# Patient Record
Sex: FEMALE | HISPANIC OR LATINO | Employment: OTHER | URBAN - METROPOLITAN AREA
[De-identification: names, ages, dates, MRNs, and addresses within clinical notes are randomized per-mention and may not be internally consistent; named-entity substitution may affect disease eponyms.]

---

## 2021-11-20 ENCOUNTER — HOSPITAL ENCOUNTER (EMERGENCY)
Facility: HOSPITAL | Age: 71
Discharge: HOME | End: 2021-11-20
Attending: EMERGENCY MEDICINE | Admitting: EMERGENCY MEDICINE

## 2021-11-20 ENCOUNTER — APPOINTMENT (OUTPATIENT)
Dept: GENERAL RADIOLOGY | Facility: HOSPITAL | Age: 71
End: 2021-11-20

## 2021-11-20 VITALS
WEIGHT: 200 LBS | BODY MASS INDEX: 36.8 KG/M2 | DIASTOLIC BLOOD PRESSURE: 83 MMHG | HEIGHT: 62 IN | SYSTOLIC BLOOD PRESSURE: 171 MMHG | HEART RATE: 61 BPM | RESPIRATION RATE: 20 BRPM | TEMPERATURE: 97.3 F | OXYGEN SATURATION: 93 %

## 2021-11-20 DIAGNOSIS — J20.6 ACUTE BRONCHITIS DUE TO RHINOVIRUS: Primary | ICD-10-CM

## 2021-11-20 LAB
ALBUMIN SERPL-MCNC: 4.4 G/DL (ref 3.5–5.2)
ALBUMIN/GLOB SERPL: 1.4 G/DL
ALP SERPL-CCNC: 80 U/L (ref 39–117)
ALT SERPL W P-5'-P-CCNC: 18 U/L (ref 1–33)
ANION GAP SERPL CALCULATED.3IONS-SCNC: 7.2 MMOL/L (ref 5–15)
AST SERPL-CCNC: 15 U/L (ref 1–32)
B PARAPERT DNA SPEC QL NAA+PROBE: NOT DETECTED
B PERT DNA SPEC QL NAA+PROBE: NOT DETECTED
BASOPHILS # BLD AUTO: 0.03 10*3/MM3 (ref 0–0.2)
BASOPHILS NFR BLD AUTO: 0.4 % (ref 0–1.5)
BILIRUB SERPL-MCNC: 0.2 MG/DL (ref 0–1.2)
BUN SERPL-MCNC: 13 MG/DL (ref 8–23)
BUN/CREAT SERPL: 13.8 (ref 7–25)
C PNEUM DNA NPH QL NAA+NON-PROBE: NOT DETECTED
CALCIUM SPEC-SCNC: 9.3 MG/DL (ref 8.6–10.5)
CHLORIDE SERPL-SCNC: 99 MMOL/L (ref 98–107)
CO2 SERPL-SCNC: 33.8 MMOL/L (ref 22–29)
CREAT SERPL-MCNC: 0.94 MG/DL (ref 0.57–1)
DEPRECATED RDW RBC AUTO: 43.2 FL (ref 37–54)
EOSINOPHIL # BLD AUTO: 0 10*3/MM3 (ref 0–0.4)
EOSINOPHIL NFR BLD AUTO: 0 % (ref 0.3–6.2)
ERYTHROCYTE [DISTWIDTH] IN BLOOD BY AUTOMATED COUNT: 13.7 % (ref 12.3–15.4)
FLUAV SUBTYP SPEC NAA+PROBE: NOT DETECTED
FLUBV RNA ISLT QL NAA+PROBE: NOT DETECTED
GFR SERPL CREATININE-BSD FRML MDRD: 58 ML/MIN/1.73
GFR SERPL CREATININE-BSD FRML MDRD: 71 ML/MIN/1.73
GLOBULIN UR ELPH-MCNC: 3.2 GM/DL
GLUCOSE SERPL-MCNC: 161 MG/DL (ref 65–99)
HADV DNA SPEC NAA+PROBE: NOT DETECTED
HCOV 229E RNA SPEC QL NAA+PROBE: NOT DETECTED
HCOV HKU1 RNA SPEC QL NAA+PROBE: NOT DETECTED
HCOV NL63 RNA SPEC QL NAA+PROBE: NOT DETECTED
HCOV OC43 RNA SPEC QL NAA+PROBE: NOT DETECTED
HCT VFR BLD AUTO: 41.2 % (ref 34–46.6)
HGB BLD-MCNC: 13.8 G/DL (ref 12–15.9)
HMPV RNA NPH QL NAA+NON-PROBE: NOT DETECTED
HPIV1 RNA ISLT QL NAA+PROBE: NOT DETECTED
HPIV2 RNA SPEC QL NAA+PROBE: NOT DETECTED
HPIV3 RNA NPH QL NAA+PROBE: NOT DETECTED
HPIV4 P GENE NPH QL NAA+PROBE: NOT DETECTED
IMM GRANULOCYTES # BLD AUTO: 0.04 10*3/MM3 (ref 0–0.05)
IMM GRANULOCYTES NFR BLD AUTO: 0.5 % (ref 0–0.5)
LYMPHOCYTES # BLD AUTO: 2.37 10*3/MM3 (ref 0.7–3.1)
LYMPHOCYTES NFR BLD AUTO: 27.8 % (ref 19.6–45.3)
M PNEUMO IGG SER IA-ACNC: NOT DETECTED
MCH RBC QN AUTO: 29.2 PG (ref 26.6–33)
MCHC RBC AUTO-ENTMCNC: 33.5 G/DL (ref 31.5–35.7)
MCV RBC AUTO: 87.3 FL (ref 79–97)
MONOCYTES # BLD AUTO: 0.62 10*3/MM3 (ref 0.1–0.9)
MONOCYTES NFR BLD AUTO: 7.3 % (ref 5–12)
NEUTROPHILS NFR BLD AUTO: 5.47 10*3/MM3 (ref 1.7–7)
NEUTROPHILS NFR BLD AUTO: 64 % (ref 42.7–76)
NRBC BLD AUTO-RTO: 0 /100 WBC (ref 0–0.2)
NT-PROBNP SERPL-MCNC: 151.2 PG/ML (ref 0–900)
PLATELET # BLD AUTO: 188 10*3/MM3 (ref 140–450)
PMV BLD AUTO: 10.4 FL (ref 6–12)
POTASSIUM SERPL-SCNC: 4.7 MMOL/L (ref 3.5–5.2)
PROT SERPL-MCNC: 7.6 G/DL (ref 6–8.5)
QT INTERVAL: 406 MS
RBC # BLD AUTO: 4.72 10*6/MM3 (ref 3.77–5.28)
RHINOVIRUS RNA SPEC NAA+PROBE: DETECTED
RSV RNA NPH QL NAA+NON-PROBE: NOT DETECTED
SARS-COV-2 RNA NPH QL NAA+NON-PROBE: NOT DETECTED
SODIUM SERPL-SCNC: 140 MMOL/L (ref 136–145)
TROPONIN T SERPL-MCNC: <0.01 NG/ML (ref 0–0.03)
WBC NRBC COR # BLD: 8.53 10*3/MM3 (ref 3.4–10.8)

## 2021-11-20 PROCEDURE — 99283 EMERGENCY DEPT VISIT LOW MDM: CPT

## 2021-11-20 PROCEDURE — 71045 X-RAY EXAM CHEST 1 VIEW: CPT

## 2021-11-20 PROCEDURE — 83880 ASSAY OF NATRIURETIC PEPTIDE: CPT | Performed by: EMERGENCY MEDICINE

## 2021-11-20 PROCEDURE — 80053 COMPREHEN METABOLIC PANEL: CPT | Performed by: EMERGENCY MEDICINE

## 2021-11-20 PROCEDURE — 84484 ASSAY OF TROPONIN QUANT: CPT | Performed by: EMERGENCY MEDICINE

## 2021-11-20 PROCEDURE — 93005 ELECTROCARDIOGRAM TRACING: CPT | Performed by: EMERGENCY MEDICINE

## 2021-11-20 PROCEDURE — 93010 ELECTROCARDIOGRAM REPORT: CPT | Performed by: INTERNAL MEDICINE

## 2021-11-20 PROCEDURE — 0202U NFCT DS 22 TRGT SARS-COV-2: CPT | Performed by: EMERGENCY MEDICINE

## 2021-11-20 PROCEDURE — 85025 COMPLETE CBC W/AUTO DIFF WBC: CPT | Performed by: EMERGENCY MEDICINE

## 2021-11-20 RX ORDER — GUAIFENESIN AND DEXTROMETHORPHAN HYDROBROMIDE 600; 30 MG/1; MG/1
1 TABLET, EXTENDED RELEASE ORAL 2 TIMES DAILY PRN
Qty: 20 TABLET | Refills: 0 | Status: SHIPPED | OUTPATIENT
Start: 2021-11-20

## 2021-11-20 RX ORDER — SODIUM CHLORIDE 0.9 % (FLUSH) 0.9 %
10 SYRINGE (ML) INJECTION AS NEEDED
Status: DISCONTINUED | OUTPATIENT
Start: 2021-11-20 | End: 2021-11-20 | Stop reason: HOSPADM

## 2021-11-20 RX ORDER — ALBUTEROL SULFATE 90 UG/1
2 AEROSOL, METERED RESPIRATORY (INHALATION) EVERY 4 HOURS PRN
Qty: 18 G | Refills: 0 | Status: SHIPPED | OUTPATIENT
Start: 2021-11-20

## 2021-11-20 NOTE — ED NOTES
C/o of cough for 2 weeks, generalized weakness. Reports dry heaving with cough, loss of appetite. Pt reports having both covid vaccines.   Primarily speaks Turkmen   This rn wearing mask and goggles. Pt wearing mask during encounter.        Maeve High, RN  11/20/21 1816       Maeve High, ANASTASIIA  11/20/21 1827

## 2021-11-21 NOTE — ED PROVIDER NOTES
EMERGENCY DEPARTMENT ENCOUNTER    Room Number:  09/09  Date seen:  11/21/2021  PCP: Provider, No Known  Historian: Patient      HPI:  Chief Complaint: Cough  A complete HPI/ROS/PMH/PSH/SH/FH are unobtainable due to: Nothing  Context: Camryn Dorsey is a 71 y.o. female who presents to the ED c/o cough.  Patient reports that her symptoms have been ongoing for about 2 weeks now.  She reports that she thought they were getting better but then she began coughing again.  She denies fever or chills.  She denies chest pain or shortness of breath.  She reports that she can hear rattling and wheezing in her chest.  She denies history of asthma or COPD.  She has had 2 Covid vaccines and she denies known exposure to COVID-19.  She denies history of congestive heart failure.  She denies leg swelling.            PAST MEDICAL HISTORY  Active Ambulatory Problems     Diagnosis Date Noted   • No Active Ambulatory Problems     Resolved Ambulatory Problems     Diagnosis Date Noted   • No Resolved Ambulatory Problems     No Additional Past Medical History         PAST SURGICAL HISTORY  No past surgical history on file.      FAMILY HISTORY  No family history on file.      SOCIAL HISTORY  Social History     Socioeconomic History   • Marital status:          ALLERGIES  Patient has no known allergies.        REVIEW OF SYSTEMS  Review of Systems   Review of all 14 systems is negative other than stated in the HPI above.      PHYSICAL EXAM  ED Triage Vitals   Temp Heart Rate Resp BP SpO2   11/20/21 1817 11/20/21 1817 11/20/21 1817 11/20/21 1910 11/20/21 1817   97.3 °F (36.3 °C) 61 20 151/82 97 %      Temp src Heart Rate Source Patient Position BP Location FiO2 (%)   -- -- -- -- --                GENERAL: Awake and alert, no acute distress  HENT: nares patent  EYES: no scleral icterus  CV: regular rhythm, normal rate  RESPIRATORY: normal effort, mild expiratory wheezing throughout all lung fields  ABDOMEN: soft, nondistended,  nontender throughout  MUSCULOSKELETAL: no deformity, no peripheral edema  NEURO: alert, moves all extremities, follows commands  PSYCH:  calm, cooperative  SKIN: warm, dry    Vital signs and nursing notes reviewed.          LAB RESULTS  Recent Results (from the past 24 hour(s))   ECG 12 Lead    Collection Time: 11/20/21  6:52 PM   Result Value Ref Range    QT Interval 406 ms   Comprehensive Metabolic Panel    Collection Time: 11/20/21  7:19 PM    Specimen: Blood   Result Value Ref Range    Glucose 161 (H) 65 - 99 mg/dL    BUN 13 8 - 23 mg/dL    Creatinine 0.94 0.57 - 1.00 mg/dL    Sodium 140 136 - 145 mmol/L    Potassium 4.7 3.5 - 5.2 mmol/L    Chloride 99 98 - 107 mmol/L    CO2 33.8 (H) 22.0 - 29.0 mmol/L    Calcium 9.3 8.6 - 10.5 mg/dL    Total Protein 7.6 6.0 - 8.5 g/dL    Albumin 4.40 3.50 - 5.20 g/dL    ALT (SGPT) 18 1 - 33 U/L    AST (SGOT) 15 1 - 32 U/L    Alkaline Phosphatase 80 39 - 117 U/L    Total Bilirubin 0.2 0.0 - 1.2 mg/dL    eGFR Non African Amer 58 (L) >60 mL/min/1.73    eGFR  African Amer 71 >60 mL/min/1.73    Globulin 3.2 gm/dL    A/G Ratio 1.4 g/dL    BUN/Creatinine Ratio 13.8 7.0 - 25.0    Anion Gap 7.2 5.0 - 15.0 mmol/L   BNP    Collection Time: 11/20/21  7:19 PM    Specimen: Blood   Result Value Ref Range    proBNP 151.2 0.0 - 900.0 pg/mL   Troponin    Collection Time: 11/20/21  7:19 PM    Specimen: Blood   Result Value Ref Range    Troponin T <0.010 0.000 - 0.030 ng/mL   CBC Auto Differential    Collection Time: 11/20/21  7:19 PM    Specimen: Blood   Result Value Ref Range    WBC 8.53 3.40 - 10.80 10*3/mm3    RBC 4.72 3.77 - 5.28 10*6/mm3    Hemoglobin 13.8 12.0 - 15.9 g/dL    Hematocrit 41.2 34.0 - 46.6 %    MCV 87.3 79.0 - 97.0 fL    MCH 29.2 26.6 - 33.0 pg    MCHC 33.5 31.5 - 35.7 g/dL    RDW 13.7 12.3 - 15.4 %    RDW-SD 43.2 37.0 - 54.0 fl    MPV 10.4 6.0 - 12.0 fL    Platelets 188 140 - 450 10*3/mm3    Neutrophil % 64.0 42.7 - 76.0 %    Lymphocyte % 27.8 19.6 - 45.3 %    Monocyte % 7.3  5.0 - 12.0 %    Eosinophil % 0.0 (L) 0.3 - 6.2 %    Basophil % 0.4 0.0 - 1.5 %    Immature Grans % 0.5 0.0 - 0.5 %    Neutrophils, Absolute 5.47 1.70 - 7.00 10*3/mm3    Lymphocytes, Absolute 2.37 0.70 - 3.10 10*3/mm3    Monocytes, Absolute 0.62 0.10 - 0.90 10*3/mm3    Eosinophils, Absolute 0.00 0.00 - 0.40 10*3/mm3    Basophils, Absolute 0.03 0.00 - 0.20 10*3/mm3    Immature Grans, Absolute 0.04 0.00 - 0.05 10*3/mm3    nRBC 0.0 0.0 - 0.2 /100 WBC   Respiratory Panel PCR w/COVID-19(SARS-CoV-2) NARGIS/FLAKO/JEAN/PAD/[facility]/MAD/YOU In-House, NP Swab in UTM/VTM, 3-4 HR TAT - Swab, Nasopharynx    Collection Time: 11/20/21  7:20 PM    Specimen: Nasopharynx; Swab   Result Value Ref Range    ADENOVIRUS, PCR Not Detected Not Detected    Coronavirus 229E Not Detected Not Detected    Coronavirus HKU1 Not Detected Not Detected    Coronavirus NL63 Not Detected Not Detected    Coronavirus OC43 Not Detected Not Detected    COVID19 Not Detected Not Detected - Ref. Range    Human Metapneumovirus Not Detected Not Detected    Human Rhinovirus/Enterovirus Detected (A) Not Detected    Influenza A PCR Not Detected Not Detected    Influenza B PCR Not Detected Not Detected    Parainfluenza Virus 1 Not Detected Not Detected    Parainfluenza Virus 2 Not Detected Not Detected    Parainfluenza Virus 3 Not Detected Not Detected    Parainfluenza Virus 4 Not Detected Not Detected    RSV, PCR Not Detected Not Detected    Bordetella pertussis pcr Not Detected Not Detected    Bordetella parapertussis PCR Not Detected Not Detected    Chlamydophila pneumoniae PCR Not Detected Not Detected    Mycoplasma pneumo by PCR Not Detected Not Detected       Ordered the above labs and reviewed the results.        RADIOLOGY  XR Chest 1 View    Result Date: 11/20/2021  Portable chest radiograph  HISTORY:Cough  TECHNIQUE: Single AP portable radiograph of the chest  COMPARISON:None      FINDINGS AND IMPRESSION: The lungs are hypoinflated. Evaluation is significantly  suboptimal due to patient body habitus. Mild asymmetric pulmonary opacification is present within the right base suggestive of atelectasis versus early pneumonia in the appropriate clinical context and correlation with patient history is recommended. No pneumothorax or pleural effusion is seen. Cardiac silhouette is accentuated by low lung volumes.  This report was finalized on 11/20/2021 7:34 PM by Dr. Chucky Tan M.D.        Ordered the above noted radiological studies. Reviewed by me in PACS.            PROCEDURES  Procedures            MEDICATIONS GIVEN IN ER  Medications - No data to display                MEDICAL DECISION MAKING, PROGRESS, and CONSULTS    All labs have been independently reviewed by me.  All radiology studies have been reviewed by me and discussed with radiologist dictating the report.   EKG's independently viewed and interpreted by me.  Discussion below represents my analysis of pertinent findings related to patient's condition, differential diagnosis, treatment plan and final disposition.      Differential diagnosis includes but is not limited to:  Pneumonia  Congestive heart failure  COVID-19 infection  Bronchitis  COPD      ED Course as of 11/21/21 0018   Sat Nov 20, 2021   1917 EKG          EKG time: 1852  Rhythm/Rate: Sinus rhythm, 61  P waves and [place]: First-degree AV block  QRS, axis: Low voltage, normal axis  ST and T waves: No acute ischemic changes    Interpreted Contemporaneously by me, independently viewed         [JR]   2055 Human Rhinovirus/Enterovirus(!): Detected [JR]   2055 COVID19: Not Detected [JR]   2111 Troponin T: <0.010 [JR]   2111 Glucose(!): 161 [JR]      ED Course User Index  [JR] Gunner Bailon MD     Presentation consistent with acute bronchitis secondary to rhinovirus infection.  She is not hypoxic and is in no respiratory distress.  She will be provided with Mucinex DM as needed as well as albuterol HFA inhaler as needed for wheezing.  COVID-19 testing  negative today.  Chest x-ray is not convincing for pneumonia and she has no leukocytosis, no hypoxia.  Antibiotics not indicated at this time.  Follow-up PCP.  Return precautions were discussed.         I wore an N95 mask, face shield, and gloves during this patient encounter.  Patient also wearing a surgical mask.  Hand hygeine performed before and after seeing the patient.    DIAGNOSIS  Final diagnoses:   Acute bronchitis due to Rhinovirus         DISPOSITION  DISCHARGE    Patient discharged in stable condition.    Reviewed implications of results, diagnosis, meds, responsibility to follow up, warning signs and symptoms of possible worsening, potential complications and reasons to return to ER.    Patient/Family voiced understanding of above instructions.    Discussed plan for discharge, as there is no emergent indication for admission. Patient referred to primary care provider for BP management due to today's BP. Pt/family is agreeable and understands need for follow up and repeat testing.  Pt is aware that discharge does not mean that nothing is wrong but it indicates no emergency is present that requires admission and they must continue care with follow-up as given below or physician of their choice.     FOLLOW-UP  PATIENT CONNECTION - [place] [id number] [phone]  Call in 1 day           Medication List      New Prescriptions    albuterol sulfate  (90 Base) MCG/ACT inhaler  Commonly known as: PROVENTIL HFA;VENTOLIN HFA;PROAIR HFA  Inhale 2 puffs Every 4 (Four) Hours As Needed for Wheezing.     guaifenesin-dextromethorphan  MG tablet sustained-release 12 hour tablet  Take 1 tablet by mouth 2 (Two) Times a Day As Needed (cough).           Where to Get Your Medications      You can get these medications from any pharmacy    Bring a paper prescription for each of these medications  · albuterol sulfate  (90 Base) MCG/ACT inhaler  · guaifenesin-dextromethorphan  MG  tablet sustained-release 12 hour tablet                   Latest Documented Vital Signs:  As of 00:18 EST  BP- 171/83 HR- 61 Temp- 97.3 °F (36.3 °C) O2 sat- 93%        --    Please note that portions of this were completed with a voice recognition program.            Gunner Bailon MD  11/21/21 0020

## 2021-11-21 NOTE — ED NOTES
This RN wearing appropriate PPE for all patient interactions including n95, gown, gloves, and eye protection. Patient wearing mask during patient interaction. Hand hygine preformed.      Kai Jo, RN  11/20/21 1929

## 2023-09-07 ENCOUNTER — APPOINTMENT (OUTPATIENT)
Dept: GENERAL RADIOLOGY | Facility: HOSPITAL | Age: 73
DRG: 243 | End: 2023-09-07
Payer: MEDICARE

## 2023-09-07 ENCOUNTER — HOSPITAL ENCOUNTER (INPATIENT)
Facility: HOSPITAL | Age: 73
LOS: 2 days | Discharge: HOME | DRG: 243 | End: 2023-09-09
Attending: EMERGENCY MEDICINE | Admitting: INTERNAL MEDICINE
Payer: MEDICARE

## 2023-09-07 DIAGNOSIS — R42 LIGHTHEADEDNESS: Primary | ICD-10-CM

## 2023-09-07 DIAGNOSIS — I44.1 SECOND DEGREE HEART BLOCK: ICD-10-CM

## 2023-09-07 DIAGNOSIS — I10 ELEVATED BLOOD PRESSURE READING IN OFFICE WITH DIAGNOSIS OF HYPERTENSION: ICD-10-CM

## 2023-09-07 PROBLEM — E11.9 DM (DIABETES MELLITUS): Status: ACTIVE | Noted: 2023-09-07

## 2023-09-07 PROBLEM — R73.9 HYPERGLYCEMIA: Status: ACTIVE | Noted: 2023-09-07

## 2023-09-07 LAB
ALBUMIN SERPL-MCNC: 4.1 G/DL (ref 3.5–5.2)
ALBUMIN/GLOB SERPL: 1.5 G/DL
ALP SERPL-CCNC: 82 U/L (ref 39–117)
ALT SERPL W P-5'-P-CCNC: 28 U/L (ref 1–33)
ANION GAP SERPL CALCULATED.3IONS-SCNC: 11 MMOL/L (ref 5–15)
AST SERPL-CCNC: 20 U/L (ref 1–32)
BASOPHILS # BLD AUTO: 0.03 10*3/MM3 (ref 0–0.2)
BASOPHILS NFR BLD AUTO: 0.4 % (ref 0–1.5)
BILIRUB SERPL-MCNC: 0.4 MG/DL (ref 0–1.2)
BUN SERPL-MCNC: 19 MG/DL (ref 8–23)
BUN/CREAT SERPL: 22.1 (ref 7–25)
CALCIUM SPEC-SCNC: 9.5 MG/DL (ref 8.6–10.5)
CHLORIDE SERPL-SCNC: 103 MMOL/L (ref 98–107)
CO2 SERPL-SCNC: 26 MMOL/L (ref 22–29)
CREAT SERPL-MCNC: 0.86 MG/DL (ref 0.57–1)
DEPRECATED RDW RBC AUTO: 43.7 FL (ref 37–54)
EGFRCR SERPLBLD CKD-EPI 2021: 70.6 ML/MIN/1.73
EOSINOPHIL # BLD AUTO: 0 10*3/MM3 (ref 0–0.4)
EOSINOPHIL NFR BLD AUTO: 0 % (ref 0.3–6.2)
ERYTHROCYTE [DISTWIDTH] IN BLOOD BY AUTOMATED COUNT: 13.7 % (ref 12.3–15.4)
GEN 5 2HR TROPONIN T REFLEX: 20 NG/L
GLOBULIN UR ELPH-MCNC: 2.8 GM/DL
GLUCOSE BLDC GLUCOMTR-MCNC: 121 MG/DL (ref 70–130)
GLUCOSE SERPL-MCNC: 155 MG/DL (ref 65–99)
HCT VFR BLD AUTO: 43.7 % (ref 34–46.6)
HGB BLD-MCNC: 14.5 G/DL (ref 12–15.9)
IMM GRANULOCYTES # BLD AUTO: 0.02 10*3/MM3 (ref 0–0.05)
IMM GRANULOCYTES NFR BLD AUTO: 0.2 % (ref 0–0.5)
LYMPHOCYTES # BLD AUTO: 2.29 10*3/MM3 (ref 0.7–3.1)
LYMPHOCYTES NFR BLD AUTO: 28.2 % (ref 19.6–45.3)
MAGNESIUM SERPL-MCNC: 2.9 MG/DL (ref 1.6–2.4)
MCH RBC QN AUTO: 29.2 PG (ref 26.6–33)
MCHC RBC AUTO-ENTMCNC: 33.2 G/DL (ref 31.5–35.7)
MCV RBC AUTO: 88.1 FL (ref 79–97)
MONOCYTES # BLD AUTO: 0.56 10*3/MM3 (ref 0.1–0.9)
MONOCYTES NFR BLD AUTO: 6.9 % (ref 5–12)
NEUTROPHILS NFR BLD AUTO: 5.21 10*3/MM3 (ref 1.7–7)
NEUTROPHILS NFR BLD AUTO: 64.3 % (ref 42.7–76)
NRBC BLD AUTO-RTO: 0 /100 WBC (ref 0–0.2)
PLATELET # BLD AUTO: 167 10*3/MM3 (ref 140–450)
PMV BLD AUTO: 12 FL (ref 6–12)
POTASSIUM SERPL-SCNC: 4.4 MMOL/L (ref 3.5–5.2)
PROT SERPL-MCNC: 6.9 G/DL (ref 6–8.5)
RBC # BLD AUTO: 4.96 10*6/MM3 (ref 3.77–5.28)
SODIUM SERPL-SCNC: 140 MMOL/L (ref 136–145)
T4 FREE SERPL-MCNC: 1.32 NG/DL (ref 0.93–1.7)
TROPONIN T DELTA: 2 NG/L
TROPONIN T SERPL HS-MCNC: 18 NG/L
TSH SERPL DL<=0.05 MIU/L-ACNC: 1.85 UIU/ML (ref 0.27–4.2)
WBC NRBC COR # BLD: 8.11 10*3/MM3 (ref 3.4–10.8)

## 2023-09-07 PROCEDURE — 93005 ELECTROCARDIOGRAM TRACING: CPT | Performed by: EMERGENCY MEDICINE

## 2023-09-07 PROCEDURE — 83735 ASSAY OF MAGNESIUM: CPT | Performed by: EMERGENCY MEDICINE

## 2023-09-07 PROCEDURE — 36415 COLL VENOUS BLD VENIPUNCTURE: CPT | Performed by: EMERGENCY MEDICINE

## 2023-09-07 PROCEDURE — 99285 EMERGENCY DEPT VISIT HI MDM: CPT

## 2023-09-07 PROCEDURE — 84443 ASSAY THYROID STIM HORMONE: CPT | Performed by: EMERGENCY MEDICINE

## 2023-09-07 PROCEDURE — 85025 COMPLETE CBC W/AUTO DIFF WBC: CPT | Performed by: EMERGENCY MEDICINE

## 2023-09-07 PROCEDURE — 80053 COMPREHEN METABOLIC PANEL: CPT | Performed by: EMERGENCY MEDICINE

## 2023-09-07 PROCEDURE — 94640 AIRWAY INHALATION TREATMENT: CPT

## 2023-09-07 PROCEDURE — 93010 ELECTROCARDIOGRAM REPORT: CPT | Performed by: STUDENT IN AN ORGANIZED HEALTH CARE EDUCATION/TRAINING PROGRAM

## 2023-09-07 PROCEDURE — 71045 X-RAY EXAM CHEST 1 VIEW: CPT

## 2023-09-07 PROCEDURE — 84439 ASSAY OF FREE THYROXINE: CPT | Performed by: EMERGENCY MEDICINE

## 2023-09-07 PROCEDURE — 82948 REAGENT STRIP/BLOOD GLUCOSE: CPT

## 2023-09-07 PROCEDURE — 84484 ASSAY OF TROPONIN QUANT: CPT | Performed by: EMERGENCY MEDICINE

## 2023-09-07 RX ORDER — SODIUM CHLORIDE 9 MG/ML
40 INJECTION, SOLUTION INTRAVENOUS AS NEEDED
Status: DISCONTINUED | OUTPATIENT
Start: 2023-09-07 | End: 2023-09-09 | Stop reason: HOSPADM

## 2023-09-07 RX ORDER — GABAPENTIN 300 MG/1
600 CAPSULE ORAL NIGHTLY
COMMUNITY

## 2023-09-07 RX ORDER — TOPIRAMATE 50 MG/1
50 TABLET, FILM COATED ORAL 2 TIMES DAILY
COMMUNITY

## 2023-09-07 RX ORDER — DICLOFENAC SODIUM AND MISOPROSTOL 50; 200 MG/1; UG/1
1 TABLET, DELAYED RELEASE ORAL 2 TIMES DAILY
COMMUNITY

## 2023-09-07 RX ORDER — FLUTICASONE PROPIONATE 50 MCG
2 SPRAY, SUSPENSION (ML) NASAL DAILY
COMMUNITY

## 2023-09-07 RX ORDER — BISACODYL 10 MG
10 SUPPOSITORY, RECTAL RECTAL DAILY PRN
Status: DISCONTINUED | OUTPATIENT
Start: 2023-09-07 | End: 2023-09-09 | Stop reason: HOSPADM

## 2023-09-07 RX ORDER — METOPROLOL TARTRATE 100 MG/1
100 TABLET ORAL 2 TIMES DAILY
COMMUNITY
End: 2023-09-09 | Stop reason: HOSPADM

## 2023-09-07 RX ORDER — OMEPRAZOLE 20 MG/1
20 CAPSULE, DELAYED RELEASE ORAL DAILY PRN
COMMUNITY

## 2023-09-07 RX ORDER — BISACODYL 5 MG/1
5 TABLET, DELAYED RELEASE ORAL DAILY PRN
Status: DISCONTINUED | OUTPATIENT
Start: 2023-09-07 | End: 2023-09-09 | Stop reason: HOSPADM

## 2023-09-07 RX ORDER — PANTOPRAZOLE SODIUM 40 MG/1
40 TABLET, DELAYED RELEASE ORAL
Status: DISCONTINUED | OUTPATIENT
Start: 2023-09-08 | End: 2023-09-09 | Stop reason: HOSPADM

## 2023-09-07 RX ORDER — ONDANSETRON 2 MG/ML
4 INJECTION INTRAMUSCULAR; INTRAVENOUS EVERY 6 HOURS PRN
Status: DISCONTINUED | OUTPATIENT
Start: 2023-09-07 | End: 2023-09-09 | Stop reason: HOSPADM

## 2023-09-07 RX ORDER — INSULIN LISPRO 100 [IU]/ML
2-9 INJECTION, SOLUTION INTRAVENOUS; SUBCUTANEOUS
Status: DISCONTINUED | OUTPATIENT
Start: 2023-09-07 | End: 2023-09-09 | Stop reason: HOSPADM

## 2023-09-07 RX ORDER — BACLOFEN 10 MG/1
10 TABLET ORAL 3 TIMES DAILY PRN
COMMUNITY

## 2023-09-07 RX ORDER — ROSUVASTATIN CALCIUM 40 MG/1
40 TABLET, COATED ORAL NIGHTLY
COMMUNITY

## 2023-09-07 RX ORDER — GABAPENTIN 300 MG/1
300 CAPSULE ORAL EVERY MORNING
Status: DISCONTINUED | OUTPATIENT
Start: 2023-09-08 | End: 2023-09-08

## 2023-09-07 RX ORDER — ALBUTEROL SULFATE 2.5 MG/3ML
2.5 SOLUTION RESPIRATORY (INHALATION) EVERY 6 HOURS PRN
Status: DISCONTINUED | OUTPATIENT
Start: 2023-09-07 | End: 2023-09-09 | Stop reason: HOSPADM

## 2023-09-07 RX ORDER — ACETAMINOPHEN 650 MG/1
650 SUPPOSITORY RECTAL EVERY 4 HOURS PRN
Status: DISCONTINUED | OUTPATIENT
Start: 2023-09-07 | End: 2023-09-08 | Stop reason: SDUPTHER

## 2023-09-07 RX ORDER — FLUTICASONE PROPIONATE 50 MCG
2 SPRAY, SUSPENSION (ML) NASAL DAILY
Status: DISCONTINUED | OUTPATIENT
Start: 2023-09-07 | End: 2023-09-09 | Stop reason: HOSPADM

## 2023-09-07 RX ORDER — ACETAMINOPHEN 160 MG/5ML
650 SOLUTION ORAL EVERY 4 HOURS PRN
Status: DISCONTINUED | OUTPATIENT
Start: 2023-09-07 | End: 2023-09-08 | Stop reason: SDUPTHER

## 2023-09-07 RX ORDER — FUROSEMIDE 40 MG/1
40 TABLET ORAL DAILY
Status: DISCONTINUED | OUTPATIENT
Start: 2023-09-07 | End: 2023-09-09 | Stop reason: HOSPADM

## 2023-09-07 RX ORDER — FLUTICASONE FUROATE, UMECLIDINIUM BROMIDE AND VILANTEROL TRIFENATATE 200; 62.5; 25 UG/1; UG/1; UG/1
1 POWDER RESPIRATORY (INHALATION) DAILY
COMMUNITY

## 2023-09-07 RX ORDER — NITROGLYCERIN 0.4 MG/1
0.4 TABLET SUBLINGUAL
Status: DISCONTINUED | OUTPATIENT
Start: 2023-09-07 | End: 2023-09-09 | Stop reason: HOSPADM

## 2023-09-07 RX ORDER — BUDESONIDE AND FORMOTEROL FUMARATE DIHYDRATE 160; 4.5 UG/1; UG/1
2 AEROSOL RESPIRATORY (INHALATION)
Status: DISCONTINUED | OUTPATIENT
Start: 2023-09-07 | End: 2023-09-09 | Stop reason: HOSPADM

## 2023-09-07 RX ORDER — FUROSEMIDE 40 MG/1
40 TABLET ORAL DAILY
COMMUNITY

## 2023-09-07 RX ORDER — POLYETHYLENE GLYCOL 3350 17 G/17G
17 POWDER, FOR SOLUTION ORAL DAILY PRN
Status: DISCONTINUED | OUTPATIENT
Start: 2023-09-07 | End: 2023-09-09 | Stop reason: HOSPADM

## 2023-09-07 RX ORDER — CETIRIZINE HYDROCHLORIDE 10 MG/1
10 TABLET ORAL DAILY
Status: DISCONTINUED | OUTPATIENT
Start: 2023-09-07 | End: 2023-09-09 | Stop reason: HOSPADM

## 2023-09-07 RX ORDER — ROSUVASTATIN CALCIUM 40 MG/1
40 TABLET, COATED ORAL NIGHTLY
Status: DISCONTINUED | OUTPATIENT
Start: 2023-09-07 | End: 2023-09-09 | Stop reason: HOSPADM

## 2023-09-07 RX ORDER — NICOTINE POLACRILEX 4 MG
15 LOZENGE BUCCAL
Status: DISCONTINUED | OUTPATIENT
Start: 2023-09-07 | End: 2023-09-09 | Stop reason: HOSPADM

## 2023-09-07 RX ORDER — HYDRALAZINE HYDROCHLORIDE 50 MG/1
50 TABLET, FILM COATED ORAL EVERY 8 HOURS SCHEDULED
Status: DISCONTINUED | OUTPATIENT
Start: 2023-09-07 | End: 2023-09-09 | Stop reason: HOSPADM

## 2023-09-07 RX ORDER — IBUPROFEN 600 MG/1
1 TABLET ORAL
Status: DISCONTINUED | OUTPATIENT
Start: 2023-09-07 | End: 2023-09-09 | Stop reason: HOSPADM

## 2023-09-07 RX ORDER — DAPAGLIFLOZIN 10 MG/1
10 TABLET, FILM COATED ORAL DAILY
COMMUNITY

## 2023-09-07 RX ORDER — TOPIRAMATE 50 MG/1
50 TABLET, FILM COATED ORAL 2 TIMES DAILY
Status: DISCONTINUED | OUTPATIENT
Start: 2023-09-07 | End: 2023-09-09 | Stop reason: HOSPADM

## 2023-09-07 RX ORDER — GABAPENTIN 300 MG/1
300 CAPSULE ORAL EVERY MORNING
COMMUNITY

## 2023-09-07 RX ORDER — ACETAMINOPHEN 325 MG/1
650 TABLET ORAL EVERY 4 HOURS PRN
Status: DISCONTINUED | OUTPATIENT
Start: 2023-09-07 | End: 2023-09-08 | Stop reason: SDUPTHER

## 2023-09-07 RX ORDER — GABAPENTIN 300 MG/1
600 CAPSULE ORAL NIGHTLY
Status: DISCONTINUED | OUTPATIENT
Start: 2023-09-07 | End: 2023-09-08

## 2023-09-07 RX ORDER — AMOXICILLIN 250 MG
2 CAPSULE ORAL 2 TIMES DAILY
Status: DISCONTINUED | OUTPATIENT
Start: 2023-09-07 | End: 2023-09-09 | Stop reason: HOSPADM

## 2023-09-07 RX ORDER — LORATADINE 10 MG/1
10 TABLET ORAL DAILY
COMMUNITY

## 2023-09-07 RX ORDER — SODIUM CHLORIDE 0.9 % (FLUSH) 0.9 %
10 SYRINGE (ML) INJECTION EVERY 12 HOURS SCHEDULED
Status: DISCONTINUED | OUTPATIENT
Start: 2023-09-07 | End: 2023-09-09 | Stop reason: HOSPADM

## 2023-09-07 RX ORDER — DULAGLUTIDE 1.5 MG/.5ML
1.5 INJECTION, SOLUTION SUBCUTANEOUS WEEKLY
COMMUNITY

## 2023-09-07 RX ORDER — DEXTROSE MONOHYDRATE 25 G/50ML
25 INJECTION, SOLUTION INTRAVENOUS
Status: DISCONTINUED | OUTPATIENT
Start: 2023-09-07 | End: 2023-09-09 | Stop reason: HOSPADM

## 2023-09-07 RX ORDER — BACLOFEN 10 MG/1
10 TABLET ORAL 3 TIMES DAILY PRN
Status: DISCONTINUED | OUTPATIENT
Start: 2023-09-07 | End: 2023-09-08

## 2023-09-07 RX ORDER — SODIUM CHLORIDE 0.9 % (FLUSH) 0.9 %
10 SYRINGE (ML) INJECTION AS NEEDED
Status: DISCONTINUED | OUTPATIENT
Start: 2023-09-07 | End: 2023-09-09 | Stop reason: HOSPADM

## 2023-09-07 RX ADMIN — ROSUVASTATIN CALCIUM 40 MG: 40 TABLET, FILM COATED ORAL at 22:21

## 2023-09-07 RX ADMIN — FUROSEMIDE 40 MG: 40 TABLET ORAL at 22:16

## 2023-09-07 RX ADMIN — Medication 10 ML: at 22:22

## 2023-09-07 RX ADMIN — TOPIRAMATE 50 MG: 50 TABLET, FILM COATED ORAL at 22:21

## 2023-09-07 RX ADMIN — FLUTICASONE PROPIONATE 2 SPRAY: 50 SPRAY, METERED NASAL at 22:21

## 2023-09-07 RX ADMIN — HYDRALAZINE HYDROCHLORIDE 50 MG: 50 TABLET, FILM COATED ORAL at 22:21

## 2023-09-07 RX ADMIN — GABAPENTIN 600 MG: 300 CAPSULE ORAL at 22:16

## 2023-09-07 RX ADMIN — BUDESONIDE AND FORMOTEROL FUMARATE DIHYDRATE 2 PUFF: 160; 4.5 AEROSOL RESPIRATORY (INHALATION) at 23:58

## 2023-09-07 RX ADMIN — CETIRIZINE HYDROCHLORIDE 10 MG: 10 TABLET ORAL at 22:20

## 2023-09-07 NOTE — PROGRESS NOTES
Clinical Pharmacy Services: Medication History    Camryn Dorsey is a 73 y.o. female presenting to [facility] for   Chief Complaint   Patient presents with    Dizziness    Fatigue       She  has no past medical history on file.    Allergies as of 09/07/2023    (No Known Allergies)       Medication information was obtained from: Patient Supplied Med List   Pharmacy and Phone Number:     Prior to Admission Medications       Prescriptions Last Dose Informant Patient Reported? Taking?    albuterol sulfate  (90 Base) MCG/ACT inhaler  Other No Yes    Inhale 2 puffs Every 4 (Four) Hours As Needed for Wheezing.    baclofen (LIORESAL) 10 MG tablet  Other Yes Yes    Take 1 tablet by mouth 3 (Three) Times a Day As Needed.    dapagliflozin Propanediol (Farxiga) 10 MG tablet  Other Yes Yes    Take 10 mg by mouth Daily.    diclofenac-miSOPROStol (ARTHROTEC 50) 50-0.2 MG EC tablet  Other Yes Yes    Take 1 tablet by mouth 2 (Two) Times a Day.    Dulaglutide (Trulicity) 1.5 MG/0.5ML solution pen-injector  Other Yes Yes    Inject 1.5 mg under the skin into the appropriate area as directed 1 (One) Time Per Week.    fluticasone (FLONASE) 50 MCG/ACT nasal spray  Other Yes Yes    2 sprays into the nostril(s) as directed by provider Daily.    Fluticasone-Umeclidin-Vilant (Trelegy Ellipta) 200-62.5-25 MCG/ACT aerosol powder   Other Yes Yes    Inhale 1 puff Daily.    furosemide (LASIX) 40 MG tablet  Other Yes Yes    Take 1 tablet by mouth Daily.    gabapentin (NEURONTIN) 300 MG capsule  Other Yes Yes    Take 1 capsule by mouth Every Morning.    gabapentin (NEURONTIN) 300 MG capsule  Other Yes Yes    Take 2 capsules by mouth Every Night.    loratadine (CLARITIN) 10 MG tablet  Other Yes Yes    Take 1 tablet by mouth Daily.    metFORMIN (GLUCOPHAGE) 1000 MG tablet  Other Yes Yes    Take 1 tablet by mouth 2 (Two) Times a Day With Meals.    metoprolol tartrate (LOPRESSOR) 100 MG tablet  Other Yes Yes    Take 1  tablet by mouth 2 (Two) Times a Day.    omeprazole (priLOSEC) 20 MG capsule  Other Yes Yes    Take 1 capsule by mouth Daily As Needed.    rosuvastatin (Crestor) 40 MG tablet  Other Yes Yes    Take 1 tablet by mouth Every Night.    topiramate (TOPAMAX) 50 MG tablet  Other Yes Yes    Take 1 tablet by mouth 2 (Two) Times a Day.    guaifenesin-dextromethorphan (MUCINEX DM)  MG tablet sustained-release 12 hour tablet   No No    Take 1 tablet by mouth 2 (Two) Times a Day As Needed (cough).              Medication notes:     This medication list is complete to the best of my knowledge as of 9/7/2023    Please call if questions.    James Mims  Medication History Technician   [phone]    9/7/2023 17:51 EDT

## 2023-09-07 NOTE — ED PROVIDER NOTES
EMERGENCY DEPARTMENT ENCOUNTER    Room Number:  38/38  Date of encounter:  9/7/2023  PCP: Provider, No Known  Historian: Patient    Patient was placed in face mask during triage process. Patient was wearing facemask when I entered the room and throughout our encounter. I wore full protective equipment throughout this patient encounter including a face mask, eye protection, and gloves. Hand hygiene was performed before donning protective equipment and again following doffing of PPE after leaving the room.    HPI:  Chief Complaint: Cardiac  A complete HPI/ROS/PMH/PSH/SH/FH are unobtainable due to: Use of   Context: Camryn Dorsey is a 73 y.o. female who presents to the ED c/o movement of dizziness and just generally not feeling well.  Patient was at adult  and so she had the nurse check her blood pressure and heart rate and found that she was bradycardic.  The patient has taken 2 doses of her metoprolol 100 mg today to help with her blood pressure.  No syncope.  The patient is recovering from recent cold-like symptoms but has no ongoing fevers.  She has a bit of a cough and mild dyspnea remaining.  No nausea vomiting, dysuria hematuria, syncope or near syncope reported.  Patient does note waxing and waning lower extremity edema that is not unusual for her and has been occurring for the last year.  No calf tenderness reported.    MEDICAL HISTORY REVIEW  EMR reviewed:    PCP office note from 7/26/23 reviewed:  Pt follow for chronic leg swelling, varicose veins, Type 2 diabetes and primary hypertension    PAST MEDICAL HISTORY  Active Ambulatory Problems     Diagnosis Date Noted    No Active Ambulatory Problems     Resolved Ambulatory Problems     Diagnosis Date Noted    No Resolved Ambulatory Problems     No Additional Past Medical History         PAST SURGICAL HISTORY  No past surgical history on file.      FAMILY HISTORY  No family history on file.      SOCIAL HISTORY  Social  History     Socioeconomic History    Marital status:          ALLERGIES  Patient has no known allergies.        REVIEW OF SYSTEMS  Review of Systems     All systems reviewed and negative except for those discussed in HPI.       PHYSICAL EXAM    I have reviewed the triage vital signs and nursing notes.    ED Triage Vitals   Temp Heart Rate Resp BP SpO2   09/07/23 1321 09/07/23 1319 09/07/23 1319 09/07/23 1319 09/07/23 1319   98.7 °F (37.1 °C) 62 16 (!) 209/93 97 %      Temp src Heart Rate Source Patient Position BP Location FiO2 (%)   09/07/23 1321 09/07/23 1319 -- -- --   Oral Monitor          Physical Exam    Physical Exam   Constitutional: No distress.  Pleasant and nontoxic  HENT:  Head: Normocephalic and atraumatic.   Oropharynx: Mucous membranes are moist.   Eyes: No scleral icterus. No conjunctival pallor.  Neck: Painless range of motion noted. Neck supple.   Cardiovascular: Normal rate, regular rhythm and intact distal pulses.  Pulmonary/Chest: No respiratory distress. There are no wheezes, no rhonchi, and no rales.  No tachypnea or increased work of breathing  Abdominal: Soft. There is no tenderness. There is no rebound and no guarding.   Musculoskeletal: Moves all extremities equally.  Mild lower extremity edema with no erythema or calf tenderness.    Neurological: Alert.  Baseline strength and sensation noted.   Skin: Skin is pink, warm, and dry. No pallor.   Psychiatric: Mood and affect normal.   Nursing note and vitals reviewed.    LAB RESULTS  Recent Results (from the past 24 hour(s))   ECG 12 Lead Other; dizziness    Collection Time: 09/07/23  3:01 PM   Result Value Ref Range    QT Interval 513 ms    QTC Interval 424 ms   CBC Auto Differential    Collection Time: 09/07/23  3:09 PM    Specimen: Blood   Result Value Ref Range    WBC 8.11 3.40 - 10.80 10*3/mm3    RBC 4.96 3.77 - 5.28 10*6/mm3    Hemoglobin 14.5 12.0 - 15.9 g/dL    Hematocrit 43.7 34.0 - 46.6 %    MCV 88.1 79.0 - 97.0 fL    MCH 29.2  26.6 - 33.0 pg    MCHC 33.2 31.5 - 35.7 g/dL    RDW 13.7 12.3 - 15.4 %    RDW-SD 43.7 37.0 - 54.0 fl    MPV 12.0 6.0 - 12.0 fL    Platelets 167 140 - 450 10*3/mm3    Neutrophil % 64.3 42.7 - 76.0 %    Lymphocyte % 28.2 19.6 - 45.3 %    Monocyte % 6.9 5.0 - 12.0 %    Eosinophil % 0.0 (L) 0.3 - 6.2 %    Basophil % 0.4 0.0 - 1.5 %    Immature Grans % 0.2 0.0 - 0.5 %    Neutrophils, Absolute 5.21 1.70 - 7.00 10*3/mm3    Lymphocytes, Absolute 2.29 0.70 - 3.10 10*3/mm3    Monocytes, Absolute 0.56 0.10 - 0.90 10*3/mm3    Eosinophils, Absolute 0.00 0.00 - 0.40 10*3/mm3    Basophils, Absolute 0.03 0.00 - 0.20 10*3/mm3    Immature Grans, Absolute 0.02 0.00 - 0.05 10*3/mm3    nRBC 0.0 0.0 - 0.2 /100 WBC   Comprehensive Metabolic Panel    Collection Time: 09/07/23  4:01 PM    Specimen: Blood   Result Value Ref Range    Glucose 155 (H) 65 - 99 mg/dL    BUN 19 8 - 23 mg/dL    Creatinine 0.86 0.57 - 1.00 mg/dL    Sodium 140 136 - 145 mmol/L    Potassium 4.4 3.5 - 5.2 mmol/L    Chloride 103 98 - 107 mmol/L    CO2 26.0 22.0 - 29.0 mmol/L    Calcium 9.5 8.6 - 10.5 mg/dL    Total Protein 6.9 6.0 - 8.5 g/dL    Albumin 4.1 3.5 - 5.2 g/dL    ALT (SGPT) 28 1 - 33 U/L    AST (SGOT) 20 1 - 32 U/L    Alkaline Phosphatase 82 39 - 117 U/L    Total Bilirubin 0.4 0.0 - 1.2 mg/dL    Globulin 2.8 gm/dL    A/G Ratio 1.5 g/dL    BUN/Creatinine Ratio 22.1 7.0 - 25.0    Anion Gap 11.0 5.0 - 15.0 mmol/L    eGFR 70.6 >60.0 mL/min/1.73   High Sensitivity Troponin T    Collection Time: 09/07/23  4:01 PM    Specimen: Blood   Result Value Ref Range    HS Troponin T 18 (H) <10 ng/L   Magnesium    Collection Time: 09/07/23  4:01 PM    Specimen: Blood   Result Value Ref Range    Magnesium 2.9 (H) 1.6 - 2.4 mg/dL   TSH    Collection Time: 09/07/23  4:01 PM    Specimen: Blood   Result Value Ref Range    TSH 1.850 0.270 - 4.200 uIU/mL   T4, Free    Collection Time: 09/07/23  4:01 PM    Specimen: Blood   Result Value Ref Range    Free T4 1.32 0.93 - 1.70 ng/dL    ECG 12 Lead Bradycardia    Collection Time: 09/07/23  5:07 PM   Result Value Ref Range    QT Interval 514 ms    QTC Interval 469 ms       Ordered the above labs and independently reviewed the results.        RADIOLOGY  No Radiology Exams Resulted Within Past 24 Hours    I ordered the above noted radiological studies. Reviewed by me and discussed with radiologist.  See dictation for official radiology interpretation.      PROCEDURES    Procedures        MEDICATIONS GIVEN IN ER    Medications - No data to display      PROGRESS, DATA ANALYSIS, CONSULTS, AND MEDICAL DECISION MAKING    My differential diagnosis includes but is not limited to generalized weakness, electrolyte abnormality, CVA, TIA, Bell's palsy, acute MI, GI bleed, urinary tract infection, systemic infections including sepsis, alcohol abuse, drug abuse including prescription and street drug.    Total critical care time: Approximately 35 minutes    Due to a high probability of clinically significant, life threatening deterioration, the patient required my highest level of preparedness to intervene emergently and I personally spent this critical care time directly and personally managing the patient. This critical care time included obtaining a history; examining the patient; vital sign monitoring; ordering and review of studies; arranging urgent treatment with development of a management plan; evaluation of patient's response to treatment; frequent reassessment; and, discussions with other providers.    This critical care time was performed to assess and manage the high probability of imminent, life-threatening deterioration that could result in multi-organ failure. It was exclusive of separately billable procedures and treating other patients and teaching time.    Please see MDM section and the rest of the note for further information on patient assessment and treatment.    All labs have been independently reviewed by me.  All radiology studies have  been reviewed by me and discussed with radiologist dictating the report.   EKG's independently viewed and interpreted by me.  Discussion below represents my analysis of pertinent findings related to patient's condition, differential diagnosis, treatment plan and final disposition.      ED Course as of 09/07/23 1745   Thu Sep 07, 2023   1509 First look: Patient brought in by EMS from adult  for bradycardia. Reportedly takes metoprolol daily, but apparently has already had 2 doses today.  No complaints, although reportedly was dizzy and fatigued earlier.  No chest pains or shortness of breath.  Plan for labs, chest x-ray, EKG, and further monitoring.  Heart rates in the 30s to 50s here in the emergency department [DC]   1639 Magnesium(!): 2.9 [RS]   1639 Glucose(!): 155 [RS]   1639 BUN: 19 [RS]   1639 Creatinine: 0.86 [RS]   1639 WBC: 8.11 [RS]   1639 Hemoglobin: 14.5 [RS]   1639 EKG           EKG time: 1501  Rhythm/Rate: Patient appears to be in third-degree heart block with junctional rhythm  P waves and ME: N/A  QRS, axis: Narrow complex with slow R wave progression  ST and T waves: No clear evidence of STEMI    Interpreted Contemporaneously by me, independently viewed  Comparison: Unavailable   [RS]   1712 EKG   #2      EKG time: 1707  Rhythm/Rate: Secondary heart block with rate in the 50s  P waves and ME: Second-degree heart block  QRS, axis: Narrow complex with slow R wave progression  ST and T waves: No STEMI; QTc within normal limits    Interpreted Contemporaneously by me, independently viewed  Comparison: Similar to prior   [RS]   1729 CONSULT        Provider: Dr. Lopez - EP    Discussion: We discussed the patient's presentation and EKG which she has reviewed.  He recommends admission to the medicine service as patient will likely need pacemaker.  He is comfortable allowing some permissive hypertension.    Agreeable c treatment and planned disposition.         [RS]   0450 I have updated the patient  with findings and concerns.  She is agreeable with plan for admission. [RS]   2570 CONSULT        Provider: Dr. Marcus GERARD    Discussion: Reviewed patient history, ED presentation and evaluation.  Agreeable to accept patient for admission.    Agreeable c treatment and planned disposition.         [RS]      ED Course User Index  [DC] Bc Cooper MD  [RS] Gennaro Long MD       AS OF 17:45 EDT VITALS:    BP - (!) 209/93  HR - 62  TEMP - 98.7 °F (37.1 °C) (Oral)  O2 SATS - 97%        DIAGNOSIS  Final diagnoses:   Lightheadedness   Elevated blood pressure reading in office with diagnosis of hypertension   Second degree heart block         DISPOSITION  ADMISSION    Discussed treatment plan and reason for admission with pt/family and admitting physician.  Pt/family voiced understanding of the plan for admission for further testing/treatment as needed.          Gennaro Long MD  09/07/23 0880

## 2023-09-07 NOTE — ED NOTES
Her vitals were checked at her adult day care center.  Her HR was 44 and she was hypertensive.  She was dizzy and fatigued.  On ems arrival HR was 30s-60s.  She takes metoprolol bid but today took it this am and again at noon    Pt speaks Tunisian

## 2023-09-07 NOTE — H&P
Internal medicine history and physical  INTERNAL MEDICINE   [facility]       Patient Identification:  Name: Camryn Dorsey  Age: 73 y.o.  Sex: female  :  1950  MRN: [id number]                   Primary Care Physician: Provider, No Known                               Date of admission:2023    Chief Complaint: Dizziness and fatigue ongoing for some time and was noted to have heart rate of 44 and elevated blood pressure at the adult  center.    History of Present Illness:   Source of information patient herself and to family members none of them speaking this very well but able to engage and answer questions using ANTERIOS linda.  Patient is a 73-year-old Czech-speaking female who was at the adult  center and was complaining of not feeling very well and being dizzy.  Alert at the [facility] assessed her and found that she was bradycardiac and her blood pressure was elevated.  Patient is taking her metoprolol twice since her morning dose.  Patient recently had upper respiratory tract infection from which she has recovered but does have some lingering cough.  According to the family members her legs have been swollen quite a bit over the course of last few months but unable to give any timeline for the symptoms.  Patient has history of chronic venous stasis type 2 diabetes and hypertension and was recently evaluated by her primary care provider which was reviewed in epic.  Patient also has history of chronic back pain peripheral neuropathy and morbid obesity.  Patient herself denies any orthopnea or PND.      Past Medical History:  Past Medical History:   Diagnosis Date    Diabetes mellitus      Past Surgical History:  History reviewed. No pertinent surgical history.   Home Meds:  Medications Prior to Admission   Medication Sig Dispense Refill Last Dose    albuterol sulfate  (90 Base) MCG/ACT inhaler Inhale 2 puffs Every 4 (Four) Hours As  Needed for Wheezing. 18 g 0     baclofen (LIORESAL) 10 MG tablet Take 1 tablet by mouth 3 (Three) Times a Day As Needed.       dapagliflozin Propanediol (Farxiga) 10 MG tablet Take 10 mg by mouth Daily.       diclofenac-miSOPROStol (ARTHROTEC 50) 50-0.2 MG EC tablet Take 1 tablet by mouth 2 (Two) Times a Day.       Dulaglutide (Trulicity) 1.5 MG/0.5ML solution pen-injector Inject 1.5 mg under the skin into the appropriate area as directed 1 (One) Time Per Week.       fluticasone (FLONASE) 50 MCG/ACT nasal spray 2 sprays into the nostril(s) as directed by provider Daily.       Fluticasone-Umeclidin-Vilant (Trelegy Ellipta) 200-62.5-25 MCG/ACT aerosol powder  Inhale 1 puff Daily.       furosemide (LASIX) 40 MG tablet Take 1 tablet by mouth Daily.       gabapentin (NEURONTIN) 300 MG capsule Take 1 capsule by mouth Every Morning.       gabapentin (NEURONTIN) 300 MG capsule Take 2 capsules by mouth Every Night.       loratadine (CLARITIN) 10 MG tablet Take 1 tablet by mouth Daily.       metFORMIN (GLUCOPHAGE) 1000 MG tablet Take 1 tablet by mouth 2 (Two) Times a Day With Meals.       metoprolol tartrate (LOPRESSOR) 100 MG tablet Take 1 tablet by mouth 2 (Two) Times a Day.       omeprazole (priLOSEC) 20 MG capsule Take 1 capsule by mouth Daily As Needed.       rosuvastatin (Crestor) 40 MG tablet Take 1 tablet by mouth Every Night.       topiramate (TOPAMAX) 50 MG tablet Take 1 tablet by mouth 2 (Two) Times a Day.       guaifenesin-dextromethorphan (MUCINEX DM)  MG tablet sustained-release 12 hour tablet Take 1 tablet by mouth 2 (Two) Times a Day As Needed (cough). 20 tablet 0      Current Meds:   No current facility-administered medications for this encounter.  Allergies:  No Known Allergies  Social History:   Social History     Tobacco Use    Smoking status: Never    Smokeless tobacco: Never   Substance Use Topics    Alcohol use: Never      Family History:  History reviewed. No pertinent family history.  "      Review of Systems  See history of present illness and past medical history.    Review system was limited because of language issue but we will  linda was used to get most of the information.      Vitals:   /57 (BP Location: Left arm, Patient Position: Lying)   Pulse (!) 48   Temp 98.7 °F (37.1 °C) (Oral)   Resp 18   Ht 152.4 cm (60\")   Wt 95.3 kg (210 lb)   SpO2 91%   BMI 41.01 kg/m²   I/O: No intake or output data in the 24 hours ending 09/1950  Exam:  Patient is examined using the personal protective equipment as per guidelines from infection control for this particular patient as enacted.  Hand washing was performed before and after patient interaction.  General Appearance:  Alert cooperative morbidly obese pleasant female does not appear to be in any acute distress   Head:    Normocephalic, without obvious abnormality, atraumatic   Eyes:    PERRL, conjunctiva/corneas clear, EOM's intact, both eyes   Ears:    Normal external ear canals, both ears   Nose:   Nares normal, septum midline, mucosa normal, no drainage    or sinus tenderness   Throat:   Lips, tongue, gums normal; oral mucosa pink and moist   Neck: Supple and no adenopathy noted   Back:     Symmetric, no curvature, ROM normal, no CVA tenderness   Lungs:   Decreased breath sounds at the bases   Chest Wall:    No tenderness or deformity    Heart:  S1-S2 bradycardic   Abdomen:   Obese soft nontender   Extremities: Bilateral lower extremity edema and chronic venous stasis changes noted   Pulses:   Pulses palpable in all extremities; symmetric all extremities   Skin: No rash noted   Neurologic: Alert and oriented and grossly nonfocal       Data Review:      I reviewed the patient's new clinical results.  Results from last 7 days   Lab Units 09/07/23  1509   WBC 10*3/mm3 8.11   HEMOGLOBIN g/dL 14.5   PLATELETS 10*3/mm3 167     Results from last 7 days   Lab Units 09/07/23  1601   SODIUM mmol/L 140   POTASSIUM mmol/L 4.4 "   CHLORIDE mmol/L 103   CO2 mmol/L 26.0   BUN mg/dL 19   CREATININE mg/dL 0.86   CALCIUM mg/dL 9.5   GLUCOSE mg/dL 155*     No radiology results for the last 30 days.  Microbiology Results (last 10 days)       ** No results found for the last 240 hours. **          ECG 12 Lead Bradycardia   Preliminary Result   HEART RATE= 50  bpm   RR Interval= 1200  ms   IN Interval= 370  ms   P Horizontal Axis= 159  deg   P Front Axis= 80  deg   QRSD Interval= 103  ms   QT Interval= 514  ms   QTcB= 469  ms   QRS Axis= -1  deg   T Wave Axis= 54  deg   - ABNORMAL ECG -   Second degree AV block, Mobitz II   Low voltage, precordial leads   Electronically Signed By:    Date and Time of Study: 2023-09-07 17:07:19      ECG 12 Lead Other; dizziness   Preliminary Result   HEART RATE= 41  bpm   RR Interval= 1463  ms   IN Interval=   ms   P Horizontal Axis=   deg   P Front Axis=   deg   QRSD Interval= 95  ms   QT Interval= 513  ms   QTcB= 424  ms   QRS Axis= -28  deg   T Wave Axis= 38  deg   - ABNORMAL ECG -   Atrial fibrillation   Ventricular premature complex   Borderline left axis deviation   Anteroseptal infarct, age indeterminate   Baseline wander in lead(s) I,III,aVR,aVL   Electronically Signed By:    Date and Time of Study: 2023-09-07 15:01:00      SCANNED - TELEMETRY     Final Result      SCANNED - TELEMETRY     Final Result      SCANNED - TELEMETRY     Final Result          Assessment:  Active Hospital Problems    Diagnosis  POA    **Second degree heart block [I44.1]  Yes    HTN (hypertension) [I10]  Unknown    DM (diabetes mellitus) [E11.9]  Unknown    Hyperglycemia [R73.9]  Unknown    Dizziness [R42]  Unknown       Medical decision making/care plan: See admitting orders  Symptomatic bradycardia with EKG evidence of second-degree AV block and paroxysmal atrial fibrillation-this likely represent AV matilde disease with atrial fibrillation further complicated by ongoing use of beta-blocker-it appears that patient's presentation has  been discussed with cardiology service and possibility of pacemaker was raised.  Formal cardiology evaluation is pending.  Plan is to hold beta-blocker use other classes of antihypertensive and introduce hydralazine while awaiting further recommendations from cardiology service.  Check 2D echo with Doppler and provided with strict BROOKE's.  Check orthostatics.  Poorly controlled hypertension-hold metoprolol given bradycardia, started on hydralazine and allow for some permissive hypertension until evaluated by cardiology service.  Diabetes mellitus with hyperglycemia-information about her blood sugar control and her diabetic regimen is unclear. She did point out that she has ongoing diarrhea and nausea that she attributes to metformin.  Plan is to provide him with Accu-Cheks and sliding scale coverage and watch for hypoglycemia and check hemoglobin A1c.  Incomplete database-patient will require periodic review systems using  as patient and her family members do not speak English very well.  Fantasma Velazquez MD   9/7/2023  19:48 EDT    Parts of this note may be an electronic transcription/translation of spoken language to printed text using the Dragon dictation system.

## 2023-09-07 NOTE — NURSING NOTE
Patient arrived to unit at this time. Accompanied by family. PCA setting up tele and obtaining vital signs. Lab here to recollect blood d/t hemolyzed.

## 2023-09-07 NOTE — ED NOTES
"Nursing report ED to floor  Camryn Dorsey  73 y.o.  female    HPI :   Chief Complaint   Patient presents with    Dizziness    Fatigue       Admitting doctor:   Fantasma Velazquez MD    Admitting diagnosis:   The primary encounter diagnosis was Lightheadedness. Diagnoses of Elevated blood pressure reading in office with diagnosis of hypertension and Second degree heart block were also pertinent to this visit.    Code status:   Current Code Status       Date Active Code Status Order ID Comments User Context       Not on file            Allergies:   Patient has no known allergies.    Isolation:   No active isolations    Intake and Output  No intake or output data in the 24 hours ending 09/07/23 1800    Weight:       09/07/23  1605   Weight: 95.3 kg (210 lb)       Most recent vitals:   Vitals:    09/07/23 1319 09/07/23 1321 09/07/23 1605 09/07/23 1800   BP: (!) 209/93   176/82   BP Location:    Left arm   Pulse: 62      Resp: 16      Temp:  98.7 °F (37.1 °C)     TempSrc:  Oral     SpO2: 97%      Weight:   95.3 kg (210 lb)    Height:   152.4 cm (60\")        Active LDAs/IV Access:   Lines, Drains & Airways       Active LDAs       Name Placement date Placement time Site Days    Peripheral IV 09/07/23 1323 Right Antecubital 09/07/23  1323  Antecubital  less than 1                    Labs (abnormal labs have a star):   Labs Reviewed   COMPREHENSIVE METABOLIC PANEL - Abnormal; Notable for the following components:       Result Value    Glucose 155 (*)     All other components within normal limits    Narrative:     GFR Normal >60  Chronic Kidney Disease <60  Kidney Failure <15    The GFR formula is only valid for adults with stable renal function between ages 18 and 70.   TROPONIN - Abnormal; Notable for the following components:    HS Troponin T 18 (*)     All other components within normal limits    Narrative:     High Sensitive Troponin T Reference Range:  <10.0 ng/L- Negative Female for AMI  <15.0 ng/L- Negative Male " for AMI  >=10 - Abnormal Female indicating possible myocardial injury.  >=15 - Abnormal Male indicating possible myocardial injury.   Clinicians would have to utilize clinical acumen, EKG, Troponin, and serial changes to determine if it is an Acute Myocardial Infarction or myocardial injury due to an underlying chronic condition.        MAGNESIUM - Abnormal; Notable for the following components:    Magnesium 2.9 (*)     All other components within normal limits   CBC WITH AUTO DIFFERENTIAL - Abnormal; Notable for the following components:    Eosinophil % 0.0 (*)     All other components within normal limits   TSH - Normal   T4, FREE - Normal    Narrative:     Results may be falsely increased if patient taking Biotin.     HIGH SENSITIVITIY TROPONIN T 2HR   CBC AND DIFFERENTIAL    Narrative:     The following orders were created for panel order CBC & Differential.  Procedure                               Abnormality         Status                     ---------                               -----------         ------                     CBC Auto Differential[[id number]]        Abnormal            Final result                 Please view results for these tests on the individual orders.       EKG:   ECG 12 Lead Bradycardia   Preliminary Result   HEART RATE= 50  bpm   RR Interval= 1200  ms   OR Interval= 370  ms   P Horizontal Axis= 159  deg   P Front Axis= 80  deg   QRSD Interval= 103  ms   QT Interval= 514  ms   QTcB= 469  ms   QRS Axis= -1  deg   T Wave Axis= 54  deg   - ABNORMAL ECG -   Second degree AV block, Mobitz II   Low voltage, precordial leads   Electronically Signed By:    Date and Time of Study: 2023-09-07 17:07:19      ECG 12 Lead Other; dizziness   Preliminary Result   HEART RATE= 41  bpm   RR Interval= 1463  ms   OR Interval=   ms   P Horizontal Axis=   deg   P Front Axis=   deg   QRSD Interval= 95  ms   QT Interval= 513  ms   QTcB= 424  ms   QRS Axis= -28  deg   T Wave Axis= 38  deg   - ABNORMAL ECG -    Atrial fibrillation   Ventricular premature complex   Borderline left axis deviation   Anteroseptal infarct, age indeterminate   Baseline wander in lead(s) I,III,aVR,aVL   Electronically Signed By:    Date and Time of Study: 2023-09-07 15:01:00          Meds given in ED:   Medications - No data to display    Imaging results:  No radiology results for the last day    Ambulatory status:   - Assist x1    Social issues:   Social History     Socioeconomic History    Marital status:        NIH Stroke Scale:       Lubna Munguia RN  09/07/23 18:00 EDT

## 2023-09-08 ENCOUNTER — APPOINTMENT (OUTPATIENT)
Dept: GENERAL RADIOLOGY | Facility: HOSPITAL | Age: 73
DRG: 243 | End: 2023-09-08
Payer: MEDICARE

## 2023-09-08 PROBLEM — E11.65 TYPE 2 DIABETES MELLITUS WITH HYPERGLYCEMIA, WITHOUT LONG-TERM CURRENT USE OF INSULIN: Status: ACTIVE | Noted: 2023-09-07

## 2023-09-08 PROBLEM — G89.29 CHRONIC HEADACHE: Status: ACTIVE | Noted: 2023-09-08

## 2023-09-08 PROBLEM — M54.9 CHRONIC BACK PAIN: Status: ACTIVE | Noted: 2023-09-08

## 2023-09-08 PROBLEM — R29.818 SUSPECTED SLEEP APNEA: Status: ACTIVE | Noted: 2023-09-08

## 2023-09-08 PROBLEM — R51.9 CHRONIC HEADACHE: Status: ACTIVE | Noted: 2023-09-08

## 2023-09-08 PROBLEM — I87.2 VENOUS INSUFFICIENCY OF BOTH LOWER EXTREMITIES: Status: ACTIVE | Noted: 2023-09-08

## 2023-09-08 PROBLEM — G89.29 CHRONIC BACK PAIN: Status: ACTIVE | Noted: 2023-09-08

## 2023-09-08 PROBLEM — R10.9 LEFT SIDED ABDOMINAL PAIN: Status: ACTIVE | Noted: 2023-09-08

## 2023-09-08 PROBLEM — R00.1 SYMPTOMATIC BRADYCARDIA: Status: ACTIVE | Noted: 2023-09-08

## 2023-09-08 PROBLEM — J44.9 COPD (CHRONIC OBSTRUCTIVE PULMONARY DISEASE): Status: ACTIVE | Noted: 2023-09-08

## 2023-09-08 LAB
ALBUMIN SERPL-MCNC: 4.1 G/DL (ref 3.5–5.2)
ALBUMIN/GLOB SERPL: 1.5 G/DL
ALP SERPL-CCNC: 80 U/L (ref 39–117)
ALT SERPL W P-5'-P-CCNC: 26 U/L (ref 1–33)
ANION GAP SERPL CALCULATED.3IONS-SCNC: 8.7 MMOL/L (ref 5–15)
AST SERPL-CCNC: 23 U/L (ref 1–32)
BASOPHILS # BLD AUTO: 0.02 10*3/MM3 (ref 0–0.2)
BASOPHILS NFR BLD AUTO: 0.3 % (ref 0–1.5)
BILIRUB SERPL-MCNC: 0.4 MG/DL (ref 0–1.2)
BUN SERPL-MCNC: 16 MG/DL (ref 8–23)
BUN/CREAT SERPL: 19 (ref 7–25)
CALCIUM SPEC-SCNC: 9 MG/DL (ref 8.6–10.5)
CHLORIDE SERPL-SCNC: 102 MMOL/L (ref 98–107)
CO2 SERPL-SCNC: 30.3 MMOL/L (ref 22–29)
CREAT SERPL-MCNC: 0.84 MG/DL (ref 0.57–1)
DEPRECATED RDW RBC AUTO: 42.8 FL (ref 37–54)
EGFRCR SERPLBLD CKD-EPI 2021: 72.6 ML/MIN/1.73
EOSINOPHIL # BLD AUTO: 0 10*3/MM3 (ref 0–0.4)
EOSINOPHIL NFR BLD AUTO: 0 % (ref 0.3–6.2)
ERYTHROCYTE [DISTWIDTH] IN BLOOD BY AUTOMATED COUNT: 13.4 % (ref 12.3–15.4)
GLOBULIN UR ELPH-MCNC: 2.8 GM/DL
GLUCOSE BLDC GLUCOMTR-MCNC: 126 MG/DL (ref 70–130)
GLUCOSE BLDC GLUCOMTR-MCNC: 136 MG/DL (ref 70–130)
GLUCOSE BLDC GLUCOMTR-MCNC: 141 MG/DL (ref 70–130)
GLUCOSE BLDC GLUCOMTR-MCNC: 184 MG/DL (ref 70–130)
GLUCOSE SERPL-MCNC: 145 MG/DL (ref 65–99)
HBA1C MFR BLD: 8.5 % (ref 4.8–5.6)
HCT VFR BLD AUTO: 42.4 % (ref 34–46.6)
HGB BLD-MCNC: 14 G/DL (ref 12–15.9)
IMM GRANULOCYTES # BLD AUTO: 0.02 10*3/MM3 (ref 0–0.05)
IMM GRANULOCYTES NFR BLD AUTO: 0.3 % (ref 0–0.5)
LYMPHOCYTES # BLD AUTO: 2.22 10*3/MM3 (ref 0.7–3.1)
LYMPHOCYTES NFR BLD AUTO: 32.4 % (ref 19.6–45.3)
MCH RBC QN AUTO: 29 PG (ref 26.6–33)
MCHC RBC AUTO-ENTMCNC: 33 G/DL (ref 31.5–35.7)
MCV RBC AUTO: 87.8 FL (ref 79–97)
MONOCYTES # BLD AUTO: 0.48 10*3/MM3 (ref 0.1–0.9)
MONOCYTES NFR BLD AUTO: 7 % (ref 5–12)
NEUTROPHILS NFR BLD AUTO: 4.11 10*3/MM3 (ref 1.7–7)
NEUTROPHILS NFR BLD AUTO: 60 % (ref 42.7–76)
NRBC BLD AUTO-RTO: 0.1 /100 WBC (ref 0–0.2)
PLATELET # BLD AUTO: 158 10*3/MM3 (ref 140–450)
PMV BLD AUTO: 11.9 FL (ref 6–12)
POTASSIUM SERPL-SCNC: 3.8 MMOL/L (ref 3.5–5.2)
PROT SERPL-MCNC: 6.9 G/DL (ref 6–8.5)
QT INTERVAL: 513 MS
QT INTERVAL: 514 MS
QTC INTERVAL: 424 MS
QTC INTERVAL: 469 MS
RBC # BLD AUTO: 4.83 10*6/MM3 (ref 3.77–5.28)
SODIUM SERPL-SCNC: 141 MMOL/L (ref 136–145)
WBC NRBC COR # BLD: 6.85 10*3/MM3 (ref 3.4–10.8)

## 2023-09-08 PROCEDURE — 94799 UNLISTED PULMONARY SVC/PX: CPT

## 2023-09-08 PROCEDURE — C1785 PMKR, DUAL, RATE-RESP: HCPCS | Performed by: INTERNAL MEDICINE

## 2023-09-08 PROCEDURE — 02H63JZ INSERTION OF PACEMAKER LEAD INTO RIGHT ATRIUM, PERCUTANEOUS APPROACH: ICD-10-PCS | Performed by: INTERNAL MEDICINE

## 2023-09-08 PROCEDURE — 33208 INSRT HEART PM ATRIAL & VENT: CPT | Performed by: INTERNAL MEDICINE

## 2023-09-08 PROCEDURE — 93005 ELECTROCARDIOGRAM TRACING: CPT | Performed by: INTERNAL MEDICINE

## 2023-09-08 PROCEDURE — C1894 INTRO/SHEATH, NON-LASER: HCPCS | Performed by: INTERNAL MEDICINE

## 2023-09-08 PROCEDURE — 94761 N-INVAS EAR/PLS OXIMETRY MLT: CPT

## 2023-09-08 PROCEDURE — 82948 REAGENT STRIP/BLOOD GLUCOSE: CPT

## 2023-09-08 PROCEDURE — 85025 COMPLETE CBC W/AUTO DIFF WBC: CPT | Performed by: INTERNAL MEDICINE

## 2023-09-08 PROCEDURE — C1898 LEAD, PMKR, OTHER THAN TRANS: HCPCS | Performed by: INTERNAL MEDICINE

## 2023-09-08 PROCEDURE — C1887 CATHETER, GUIDING: HCPCS | Performed by: INTERNAL MEDICINE

## 2023-09-08 PROCEDURE — 25010000002 MIDAZOLAM PER 1 MG: Performed by: INTERNAL MEDICINE

## 2023-09-08 PROCEDURE — 99153 MOD SED SAME PHYS/QHP EA: CPT | Performed by: INTERNAL MEDICINE

## 2023-09-08 PROCEDURE — 94664 DEMO&/EVAL PT USE INHALER: CPT

## 2023-09-08 PROCEDURE — 25010000002 CEFAZOLIN IN DEXTROSE 2-4 GM/100ML-% SOLUTION: Performed by: INTERNAL MEDICINE

## 2023-09-08 PROCEDURE — 25010000002 FENTANYL CITRATE (PF) 50 MCG/ML SOLUTION: Performed by: INTERNAL MEDICINE

## 2023-09-08 PROCEDURE — 0 LIDOCAINE 1 % SOLUTION: Performed by: INTERNAL MEDICINE

## 2023-09-08 PROCEDURE — 80053 COMPREHEN METABOLIC PANEL: CPT | Performed by: INTERNAL MEDICINE

## 2023-09-08 PROCEDURE — 94760 N-INVAS EAR/PLS OXIMETRY 1: CPT

## 2023-09-08 PROCEDURE — 93010 ELECTROCARDIOGRAM REPORT: CPT | Performed by: INTERNAL MEDICINE

## 2023-09-08 PROCEDURE — 0JH606Z INSERTION OF PACEMAKER, DUAL CHAMBER INTO CHEST SUBCUTANEOUS TISSUE AND FASCIA, OPEN APPROACH: ICD-10-PCS | Performed by: INTERNAL MEDICINE

## 2023-09-08 PROCEDURE — 71045 X-RAY EXAM CHEST 1 VIEW: CPT

## 2023-09-08 PROCEDURE — 83036 HEMOGLOBIN GLYCOSYLATED A1C: CPT | Performed by: NURSE PRACTITIONER

## 2023-09-08 PROCEDURE — 99152 MOD SED SAME PHYS/QHP 5/>YRS: CPT | Performed by: INTERNAL MEDICINE

## 2023-09-08 PROCEDURE — 02HK3JZ INSERTION OF PACEMAKER LEAD INTO RIGHT VENTRICLE, PERCUTANEOUS APPROACH: ICD-10-PCS | Performed by: INTERNAL MEDICINE

## 2023-09-08 PROCEDURE — 63710000001 INSULIN LISPRO (HUMAN) PER 5 UNITS: Performed by: INTERNAL MEDICINE

## 2023-09-08 PROCEDURE — 99222 1ST HOSP IP/OBS MODERATE 55: CPT | Performed by: INTERNAL MEDICINE

## 2023-09-08 PROCEDURE — 25510000001 IOPAMIDOL PER 1 ML: Performed by: INTERNAL MEDICINE

## 2023-09-08 DEVICE — LD PM CAPSUREFIX NOVUS5076 52CM: Type: IMPLANTABLE DEVICE | Status: FUNCTIONAL

## 2023-09-08 DEVICE — IMPLANTABLE DEVICE: Type: IMPLANTABLE DEVICE | Status: FUNCTIONAL

## 2023-09-08 DEVICE — GEN PM AZURE S SURESCAN DR MRI: Type: IMPLANTABLE DEVICE | Status: FUNCTIONAL

## 2023-09-08 RX ORDER — LIDOCAINE HYDROCHLORIDE 10 MG/ML
INJECTION, SOLUTION INFILTRATION; PERINEURAL
Status: DISCONTINUED | OUTPATIENT
Start: 2023-09-08 | End: 2023-09-08 | Stop reason: HOSPADM

## 2023-09-08 RX ORDER — SODIUM CHLORIDE 0.9 % (FLUSH) 0.9 %
3 SYRINGE (ML) INJECTION EVERY 12 HOURS SCHEDULED
Status: DISCONTINUED | OUTPATIENT
Start: 2023-09-08 | End: 2023-09-09 | Stop reason: HOSPADM

## 2023-09-08 RX ORDER — FENTANYL CITRATE 50 UG/ML
INJECTION, SOLUTION INTRAMUSCULAR; INTRAVENOUS
Status: DISCONTINUED | OUTPATIENT
Start: 2023-09-08 | End: 2023-09-08 | Stop reason: HOSPADM

## 2023-09-08 RX ORDER — LIDOCAINE HYDROCHLORIDE 20 MG/ML
INJECTION, SOLUTION INFILTRATION; PERINEURAL
Status: DISCONTINUED | OUTPATIENT
Start: 2023-09-08 | End: 2023-09-08 | Stop reason: HOSPADM

## 2023-09-08 RX ORDER — ACETAMINOPHEN 325 MG/1
650 TABLET ORAL EVERY 4 HOURS PRN
Status: DISCONTINUED | OUTPATIENT
Start: 2023-09-08 | End: 2023-09-09 | Stop reason: HOSPADM

## 2023-09-08 RX ORDER — ONDANSETRON 2 MG/ML
4 INJECTION INTRAMUSCULAR; INTRAVENOUS EVERY 6 HOURS PRN
Status: DISCONTINUED | OUTPATIENT
Start: 2023-09-08 | End: 2023-09-08 | Stop reason: SDUPTHER

## 2023-09-08 RX ORDER — MIDAZOLAM HYDROCHLORIDE 1 MG/ML
INJECTION INTRAMUSCULAR; INTRAVENOUS
Status: DISCONTINUED | OUTPATIENT
Start: 2023-09-08 | End: 2023-09-08 | Stop reason: HOSPADM

## 2023-09-08 RX ORDER — SODIUM CHLORIDE 0.9 % (FLUSH) 0.9 %
10 SYRINGE (ML) INJECTION AS NEEDED
Status: DISCONTINUED | OUTPATIENT
Start: 2023-09-08 | End: 2023-09-09 | Stop reason: HOSPADM

## 2023-09-08 RX ORDER — SODIUM CHLORIDE 9 MG/ML
40 INJECTION, SOLUTION INTRAVENOUS AS NEEDED
Status: DISCONTINUED | OUTPATIENT
Start: 2023-09-08 | End: 2023-09-09 | Stop reason: HOSPADM

## 2023-09-08 RX ORDER — GABAPENTIN 300 MG/1
300 CAPSULE ORAL NIGHTLY
Status: DISCONTINUED | OUTPATIENT
Start: 2023-09-08 | End: 2023-09-09 | Stop reason: HOSPADM

## 2023-09-08 RX ORDER — ACETAMINOPHEN 650 MG/1
650 SUPPOSITORY RECTAL EVERY 4 HOURS PRN
Status: DISCONTINUED | OUTPATIENT
Start: 2023-09-08 | End: 2023-09-09 | Stop reason: HOSPADM

## 2023-09-08 RX ORDER — CEFAZOLIN SODIUM 2 G/100ML
INJECTION, SOLUTION INTRAVENOUS
Status: COMPLETED | OUTPATIENT
Start: 2023-09-08 | End: 2023-09-08

## 2023-09-08 RX ORDER — METHOHEXITAL IN WATER/PF 100MG/10ML
SYRINGE (ML) INTRAVENOUS
Status: DISCONTINUED | OUTPATIENT
Start: 2023-09-08 | End: 2023-09-08 | Stop reason: HOSPADM

## 2023-09-08 RX ORDER — NALOXONE HCL 0.4 MG/ML
0.4 VIAL (ML) INJECTION
Status: DISCONTINUED | OUTPATIENT
Start: 2023-09-08 | End: 2023-09-09 | Stop reason: HOSPADM

## 2023-09-08 RX ORDER — HYDROMORPHONE HYDROCHLORIDE 1 MG/ML
0.5 INJECTION, SOLUTION INTRAMUSCULAR; INTRAVENOUS; SUBCUTANEOUS
Status: DISCONTINUED | OUTPATIENT
Start: 2023-09-08 | End: 2023-09-09 | Stop reason: HOSPADM

## 2023-09-08 RX ORDER — GABAPENTIN 100 MG/1
100 CAPSULE ORAL EVERY MORNING
Status: DISCONTINUED | OUTPATIENT
Start: 2023-09-09 | End: 2023-09-09 | Stop reason: HOSPADM

## 2023-09-08 RX ORDER — SODIUM CHLORIDE 9 MG/ML
INJECTION, SOLUTION INTRAVENOUS
Status: COMPLETED | OUTPATIENT
Start: 2023-09-08 | End: 2023-09-08

## 2023-09-08 RX ADMIN — Medication 3 ML: at 21:13

## 2023-09-08 RX ADMIN — FUROSEMIDE 40 MG: 40 TABLET ORAL at 09:23

## 2023-09-08 RX ADMIN — Medication 10 ML: at 09:24

## 2023-09-08 RX ADMIN — HYDRALAZINE HYDROCHLORIDE 50 MG: 50 TABLET, FILM COATED ORAL at 21:09

## 2023-09-08 RX ADMIN — HYDRALAZINE HYDROCHLORIDE 50 MG: 50 TABLET, FILM COATED ORAL at 15:58

## 2023-09-08 RX ADMIN — BACLOFEN 10 MG: 10 TABLET ORAL at 09:37

## 2023-09-08 RX ADMIN — GABAPENTIN 300 MG: 300 CAPSULE ORAL at 07:24

## 2023-09-08 RX ADMIN — HYDRALAZINE HYDROCHLORIDE 50 MG: 50 TABLET, FILM COATED ORAL at 07:24

## 2023-09-08 RX ADMIN — BUDESONIDE AND FORMOTEROL FUMARATE DIHYDRATE 2 PUFF: 160; 4.5 AEROSOL RESPIRATORY (INHALATION) at 21:35

## 2023-09-08 RX ADMIN — CETIRIZINE HYDROCHLORIDE 10 MG: 10 TABLET ORAL at 09:23

## 2023-09-08 RX ADMIN — BUDESONIDE AND FORMOTEROL FUMARATE DIHYDRATE 2 PUFF: 160; 4.5 AEROSOL RESPIRATORY (INHALATION) at 07:31

## 2023-09-08 RX ADMIN — SENNOSIDES AND DOCUSATE SODIUM 2 TABLET: 50; 8.6 TABLET ORAL at 21:09

## 2023-09-08 RX ADMIN — TOPIRAMATE 50 MG: 50 TABLET, FILM COATED ORAL at 21:09

## 2023-09-08 RX ADMIN — ACETAMINOPHEN 650 MG: 325 TABLET, FILM COATED ORAL at 19:53

## 2023-09-08 RX ADMIN — PANTOPRAZOLE SODIUM 40 MG: 40 TABLET, DELAYED RELEASE ORAL at 07:25

## 2023-09-08 RX ADMIN — GABAPENTIN 300 MG: 300 CAPSULE ORAL at 21:09

## 2023-09-08 RX ADMIN — Medication 10 ML: at 21:13

## 2023-09-08 RX ADMIN — FLUTICASONE PROPIONATE 2 SPRAY: 50 SPRAY, METERED NASAL at 09:23

## 2023-09-08 RX ADMIN — ACETAMINOPHEN 650 MG: 325 TABLET, FILM COATED ORAL at 09:37

## 2023-09-08 RX ADMIN — SENNOSIDES AND DOCUSATE SODIUM 2 TABLET: 50; 8.6 TABLET ORAL at 09:23

## 2023-09-08 RX ADMIN — TOPIRAMATE 50 MG: 50 TABLET, FILM COATED ORAL at 09:23

## 2023-09-08 RX ADMIN — INSULIN LISPRO 2 UNITS: 100 INJECTION, SOLUTION INTRAVENOUS; SUBCUTANEOUS at 21:58

## 2023-09-08 RX ADMIN — ROSUVASTATIN CALCIUM 40 MG: 40 TABLET, FILM COATED ORAL at 21:09

## 2023-09-08 RX ADMIN — TIOTROPIUM BROMIDE INHALATION SPRAY 2 PUFF: 3.12 SPRAY, METERED RESPIRATORY (INHALATION) at 07:31

## 2023-09-08 NOTE — CASE MANAGEMENT/SOCIAL WORK
Discharge Planning Assessment  [facility]     Patient Name: Camryn Dorsey  MRN: [id number]  Today's Date: 9/8/2023    Admit Date: 9/7/2023    Plan: Home, family to transport   Discharge Needs Assessment       Row Name 09/08/23 1135       Living Environment    People in Home spouse    Current Living Arrangements home    Primary Care Provided by self    Provides Primary Care For no one    Family Caregiver if Needed child(santosh), adult;spouse    Able to Return to Prior Arrangements yes       Resource/Environmental Concerns    Resource/Environmental Concerns none       Transition Planning    Patient/Family Anticipates Transition to home with family    Patient/Family Anticipated Services at Transition none    Transportation Anticipated family or friend will provide       Discharge Needs Assessment    Equipment Currently Used at Home walker, rolling    Concerns to be Addressed discharge planning                   Discharge Plan       Row Name 09/08/23 1135       Plan    Plan Home, family to transport    Patient/Family in Agreement with Plan yes    Plan Comments CCP spoke with patient using neha Fowler as ; explained role, verified facesheet, and discussed dc plan. Patient uses a walker at home. She lives with her spouse in an apartment with an elevator to enter. She has no history of HH or SNF. Patient does to an adult day center called [facility] 3 days/week from 7:45am-3:00pm and does activities, exercise classes, etc. Patient states plan is home and denies any dc needs. Son will be able to transport home. RUTHY MACKEY                  Continued Care and Services - Admitted Since 9/7/2023    Coordination has not been started for this encounter.          Demographic Summary       Row Name 09/08/23 1134       General Information    Admission Type inpatient    Arrived From home    Referral Source admission list    Reason for Consult discharge planning    Preferred Language Moldovan       Contact  Information    Permission Granted to Share Info With family/designee                   Functional Status       Row Name 09/08/23 1135       Functional Status    Usual Activity Tolerance fair    Current Activity Tolerance fair       Functional Status, IADL    Medications assistive equipment and person    Meal Preparation assistive equipment and person    Housekeeping assistive equipment and person    Laundry assistive equipment and person    Shopping assistive equipment and person       Mental Status    General Appearance WDL WDL                   Psychosocial    No documentation.                  Abuse/Neglect    No documentation.                  Legal    No documentation.                  Substance Abuse    No documentation.                  Patient Forms    No documentation.                     RUTHY Tejada

## 2023-09-08 NOTE — PLAN OF CARE
Goal Outcome Evaluation:  Plan of Care Reviewed With: patient           Outcome Evaluation: pt has been NPO for most of the day for pacemaker placement; heart rate has been in the 40s- low 60s most of the day; AC/HS; pt is to have a doppler of the lower extremities and CT of abd/pelvis done for some complaints of pain at times; safety maintained; continue to monitor

## 2023-09-08 NOTE — CONSULTS
Patient Name: Camryn Dorsey  Age/Sex: 73 y.o. female  : 1950  MRN: [id number]    Date of Admission: 2023  Date of Encounter Visit: 23  Encounter Provider: Matty Garcia MD  Place of Service: [facility] CARDIOLOGY      Referring Provider: Fantasma Velazquez MD  Patient Care Team:  Provider, No Known as PCP - General    Subjective:       Chief Complaint:   Fatigue    History of Present Illness:  Camryn Dorsey is a 73 y.o. female presenting with complaint of fatigue and low heart rate.     She is Spainish speaking only, I obtained the following history from her and her family with the use of an .     She was at her adult  yesterday.  She goes there several times per week.      She was complaining of feeling fatigue and lightheaded.     They took vitals and she was bradycardiac with an elevated blood pressure.     She was referred to the ED.      The first EKG here is inconclusive, possibly high degree AV Block    Second EKG is Wenckebach block.     Overnight, she has been in Wenckebach block.           Past Medical History:  Past Medical History:   Diagnosis Date    Diabetes mellitus        History reviewed. No pertinent surgical history.    Home Medications:   Medications Prior to Admission   Medication Sig Dispense Refill Last Dose    albuterol sulfate  (90 Base) MCG/ACT inhaler Inhale 2 puffs Every 4 (Four) Hours As Needed for Wheezing. 18 g 0     baclofen (LIORESAL) 10 MG tablet Take 1 tablet by mouth 3 (Three) Times a Day As Needed.       dapagliflozin Propanediol (Farxiga) 10 MG tablet Take 10 mg by mouth Daily.       diclofenac-miSOPROStol (ARTHROTEC 50) 50-0.2 MG EC tablet Take 1 tablet by mouth 2 (Two) Times a Day.       Dulaglutide (Trulicity) 1.5 MG/0.5ML solution pen-injector Inject 1.5 mg under the skin into the appropriate area as directed 1 (One) Time Per Week.       fluticasone (FLONASE) 50 MCG/ACT nasal spray  2 sprays into the nostril(s) as directed by provider Daily.       Fluticasone-Umeclidin-Vilant (Trelegy Ellipta) 200-62.5-25 MCG/ACT aerosol powder  Inhale 1 puff Daily.       furosemide (LASIX) 40 MG tablet Take 1 tablet by mouth Daily.       gabapentin (NEURONTIN) 300 MG capsule Take 1 capsule by mouth Every Morning.       gabapentin (NEURONTIN) 300 MG capsule Take 2 capsules by mouth Every Night.       loratadine (CLARITIN) 10 MG tablet Take 1 tablet by mouth Daily.       metFORMIN (GLUCOPHAGE) 1000 MG tablet Take 1 tablet by mouth 2 (Two) Times a Day With Meals.       metoprolol tartrate (LOPRESSOR) 100 MG tablet Take 1 tablet by mouth 2 (Two) Times a Day.       omeprazole (priLOSEC) 20 MG capsule Take 1 capsule by mouth Daily As Needed.       rosuvastatin (Crestor) 40 MG tablet Take 1 tablet by mouth Every Night.       topiramate (TOPAMAX) 50 MG tablet Take 1 tablet by mouth 2 (Two) Times a Day.       guaifenesin-dextromethorphan (MUCINEX DM)  MG tablet sustained-release 12 hour tablet Take 1 tablet by mouth 2 (Two) Times a Day As Needed (cough). 20 tablet 0        Allergies:  No Known Allergies    Past Social History:  Social History     Socioeconomic History    Marital status:    Tobacco Use    Smoking status: Never    Smokeless tobacco: Never   Vaping Use    Vaping Use: Never used   Substance and Sexual Activity    Alcohol use: Never    Drug use: Never    Sexual activity: Defer        Past Family History:  History reviewed. No pertinent family history.    Review of Systems: All systems reviewed. Pertinent positives identified in HPI. All other systems are negative.     Review of Systems   Constitutional: Negative.   Cardiovascular: Negative.    Respiratory: Negative.     Gastrointestinal: Negative.        Objective:     Objective:  Temp:  [97.3 °F (36.3 °C)-98.7 °F (37.1 °C)] 97.3 °F (36.3 °C)  Heart Rate:  [40-62] 54  Resp:  [16-18] 18  BP: (130-209)/(51-93) 151/61    Intake/Output Summary  (Last 24 hours) at 9/8/2023 1016  Last data filed at 9/8/2023 0924  Gross per 24 hour   Intake --   Output 1600 ml   Net -1600 ml     Body mass index is 41.01 kg/m².      09/07/23  1605   Weight: 95.3 kg (210 lb)           Physical Exam:   Vitals and nursing note reviewed.   Constitutional:       General: Not in acute distress.     Appearance: Well-developed and not in distress. Frail. Not diaphoretic.   Eyes:      General:         Right eye: No discharge.         Left eye: No discharge.   HENT:      Head: Normocephalic and atraumatic.    Mouth/Throat:      Pharynx: No oropharyngeal exudate.   Neck:      Thyroid: No thyromegaly.   Pulmonary:      Effort: Pulmonary effort is normal. No respiratory distress.      Breath sounds: Normal breath sounds.   Cardiovascular:      Normal rate. Regularly irregular rhythm.   Edema:     Peripheral edema absent.   Abdominal:      General: There is no distension.      Palpations: Abdomen is soft.      Tenderness: There is no abdominal tenderness.   Musculoskeletal:         General: No deformity.      Cervical back: Normal range of motion and neck supple. Skin:     General: Skin is warm and dry.   Neurological:      Mental Status: Alert and oriented to person, place, and time.      Deep Tendon Reflexes: Reflexes are normal and symmetric.   Psychiatric:         Behavior: Behavior normal. Behavior is cooperative.         Thought Content: Thought content normal.         Judgment: Judgment normal.         Lab Review:     Results from last 7 days   Lab Units 09/08/23  0426 09/07/23  1601   SODIUM mmol/L 141 140   POTASSIUM mmol/L 3.8 4.4   CHLORIDE mmol/L 102 103   CO2 mmol/L 30.3* 26.0   BUN mg/dL 16 19   CREATININE mg/dL 0.84 0.86   GLUCOSE mg/dL 145* 155*   CALCIUM mg/dL 9.0 9.5       Results from last 7 days   Lab Units 09/07/23  1907 09/07/23  1601   HSTROP T ng/L 20* 18*     Results from last 7 days   Lab Units 09/08/23  0426   WBC 10*3/mm3 6.85   HEMOGLOBIN g/dL 14.0   HEMATOCRIT  % 42.4   PLATELETS 10*3/mm3 158             Results from last 7 days   Lab Units 09/07/23  1601   MAGNESIUM mg/dL 2.9*                 Results from last 7 days   Lab Units 09/07/23  1601   TSH uIU/mL 1.850       Imaging:    Imaging Results (Most Recent)       Procedure Component Value Units Date/Time    XR Chest 1 View [[id number]] Collected: 09/07/23 1843     Updated: 09/07/23 1846    Narrative:      Clinical: Lightheaded     COMPARISON 11/20/2021     FINDINGS: There is cardiac enlargement as before. The mediastinum is  stable. No effusion, edema or acute airspace disease is demonstrated.     CONCLUSION: Stable cardiomegaly.     This report was finalized on 9/7/2023 6:43 PM by Dr. Jah Ingram M.D.               EKG:   Past EKG has shown a long 1st degree AV Block.     Baseline:     I personally viewed and interpreted the patient's EKG/Telemetry tracings.    Assessment:   Assessment & Plan         Second degree heart block    HTN (hypertension)    DM (diabetes mellitus)    Hyperglycemia    Dizziness        Plan:     I think she is having periods of high degree AV Block    I recommended a pacemaker over a strategy of observation.     The patient and her family were strongly in favor a moving forward with a pacemaker.     We discussed the risk pacemaker implantation.  They wished to proceed.     Thank you for allowing me to participate in the care of Camryn Dorsey. Feel free to contact me directly with any further questions or concerns.    Matty Garcia MD  09/08/23  10:16 EDT

## 2023-09-08 NOTE — PROGRESS NOTES
Name: Camryn Dorsey ADMIT: 2023   : 1950  PCP: Provider, No Known    MRN: [id number] LOS: 1 days   AGE/SEX: 73 y.o. female  ROOM: [facility]/1     Subjective   Subjective   Resting in bed.  2 family members at bedside.  Patient is Bulgarian-speaking only as well as the family members at bedside.  Phone  was used to discuss plan of care.    Patient currently very sleepy as apparently she got some pain medication.  He reports that she goes to an adult  a few times a week and was dizzy and found to have a low heart rate which prompted her admission.  Patient currently denies any chest pain or trouble breathing.  She denies any nausea or vomiting but is having some intermittent left sided abdominal cramping as well as bilateral lower extremity cramping at times.  She has had some swelling in her legs worse on the right.  Plans for pacemaker later tonight.      Objective   Objective   Vital Signs  Temp:  [97.3 °F (36.3 °C)-98.4 °F (36.9 °C)] 97.9 °F (36.6 °C)  Heart Rate:  [40-60] 60  Resp:  [18] 18  BP: (129-176)/(51-86) 129/65  SpO2:  [91 %-99 %] 97 %  on  Flow (L/min):  [2] 2;   Device (Oxygen Therapy): nasal cannula  Body mass index is 41.01 kg/m².    Physical Exam  Vitals and nursing note reviewed.   Constitutional:       Appearance: She is ill-appearing. She is not toxic-appearing.      Comments: Drowsy at present   Cardiovascular:      Rate and Rhythm: Regular rhythm. Bradycardia present.      Pulses: Normal pulses.   Pulmonary:      Effort: Pulmonary effort is normal. No respiratory distress.      Comments: Diminished throughout and on room air.   Abdominal:      General: Bowel sounds are normal. There is distension (Rotund abdomen secondary to obesity).      Palpations: Abdomen is soft.      Tenderness: There is no abdominal tenderness.   Musculoskeletal:         General: Swelling (RLE greater than LLE) present. Normal range of motion.   Skin:     General: Skin is warm and dry.       Findings: No bruising.   Neurological:      Motor: Weakness present.      Coordination: Coordination normal.      Comments: Aware of self, place and situation.  Does fall asleep easily during exam   Psychiatric:         Mood and Affect: Mood normal.         Behavior: Behavior normal.      Comments: Little sedated     Results Review:       I reviewed the patient's new clinical results.  Results from last 7 days   Lab Units 09/08/23  0426 09/07/23  1509   WBC 10*3/mm3 6.85 8.11   HEMOGLOBIN g/dL 14.0 14.5   PLATELETS 10*3/mm3 158 167     Results from last 7 days   Lab Units 09/08/23  0426 09/07/23  1601   SODIUM mmol/L 141 140   POTASSIUM mmol/L 3.8 4.4   CHLORIDE mmol/L 102 103   CO2 mmol/L 30.3* 26.0   BUN mg/dL 16 19   CREATININE mg/dL 0.84 0.86   GLUCOSE mg/dL 145* 155*   Estimated Creatinine Clearance: 59.7 mL/min (by C-G formula based on SCr of 0.84 mg/dL).  Results from last 7 days   Lab Units 09/08/23  0426 09/07/23  1601   ALBUMIN g/dL 4.1 4.1   BILIRUBIN mg/dL 0.4 0.4   ALK PHOS U/L 80 82   AST (SGOT) U/L 23 20   ALT (SGPT) U/L 26 28     Results from last 7 days   Lab Units 09/08/23  0426 09/07/23  1601   CALCIUM mg/dL 9.0 9.5   ALBUMIN g/dL 4.1 4.1   MAGNESIUM mg/dL  --  2.9*       Glucose   Date/Time Value Ref Range Status   09/08/2023 1125 141 (H) 70 - 130 mg/dL Final   09/08/2023 0740 126 70 - 130 mg/dL Final   09/07/2023 1927 121 70 - 130 mg/dL Final       budesonide-formoterol, 2 puff, Inhalation, BID - RT   And  tiotropium bromide monohydrate, 2 puff, Inhalation, Daily - RT  cetirizine, 10 mg, Oral, Daily  fluticasone, 2 spray, Nasal, Daily  furosemide, 40 mg, Oral, Daily  gabapentin, 300 mg, Oral, QAM  gabapentin, 600 mg, Oral, Nightly  hydrALAZINE, 50 mg, Oral, Q8H  insulin lispro, 2-9 Units, Subcutaneous, 4x Daily AC & at Bedtime  pantoprazole, 40 mg, Oral, Q AM  rosuvastatin, 40 mg, Oral, Nightly  senna-docusate sodium, 2 tablet, Oral, BID  sodium chloride, 10 mL, Intravenous,  Q12H  topiramate, 50 mg, Oral, BID       NPO Diet NPO Type: Sips with Meds, Ice Chips       Assessment/Plan     Active Hospital Problems    Diagnosis  POA    **Symptomatic bradycardia [R00.1]  Yes    Venous insufficiency of both lower extremities [I87.2]  Yes    Left sided abdominal pain [R10.9]  Yes    COPD (chronic obstructive pulmonary disease) [J44.9]  Yes    Chronic back pain [M54.9, G89.29]  Yes    Chronic headache [R51.9, G89.29]  Yes    Suspected sleep apnea [R29.818]  Yes    Second degree heart block [I44.1]  Yes    HTN (hypertension) [I10]  Yes    Type 2 diabetes mellitus with hyperglycemia, without long-term current use of insulin [E11.65]  Yes    Dizziness [R42]  Yes      Resolved Hospital Problems   No resolved problems to display.     Mrs. Dorsey is a 73 y.o.-year-old female that presented to the hospital with complaints of dizziness and fatigue.  She was found to have low heart rate and elevated blood pressure while at her adult .  While in the ED there was concerns for possible high degree AV block and repeat EKG revealed Wenckebach.  Cardiology was consulted.  Family does report some concerns given recent left-sided abdominal pain as well as intermittent bilateral lower extremity cramping and swelling.    Symptomatic bradycardia  Second-degree heart block  -Cardiology has been consulted.  They believe she is having periods of high degree AV block.   -Pacemaker has been recommended and is scheduled for later this evening.   -Monitor on telemetry.     Venous insufficiency of both lower extremities  -Complaints of bilateral leg cramping with increased edema.   -Appears some of the symptoms are more chronic than previously reported given she has been seen by vascular in the past at [place].  -Previously recommended to use compression stockings, leg elevation, continue weight loss.   -Given right greater than left edema, will obtain venous Dopplers to rule out any associated DVT that could be  causing cramping or pain.   -If negative we will plan to order some compression stockings while inpatient.   -Home Lasix was restarted.     Left-sided abdominal pain  -Somewhat vague complaint and unassociated with any nausea or vomiting at present.  -LFTs are normal and lab work pretty unremarkable.  -We will obtain noncontrasted CT for further evaluation.     Suspected sleep apnea  COPD  -Has been seen by pulmonary at [facility].  -Plans for outpatient sleep study.  Continue plan to follow-up.  -Home inhalers resumed.  Respiratory status appears stable.  -Avoid oversedation-stop baclofen and reduced home gabapentin dosing for now.     DM 2  -Hold oral agents.  -Correctional sliding scale insulin while admitted.  -Check A1c.    Chronic headache  Chronic back pain  -Topamax resumed.  -Plans for outpatient MRI with PCP.  -Medication adjustments as above to avoid any sedation.     Discussed with patient and family at bedside via . Discussed with nurse as well.    PT/OT to see. Lives at home with  at baseline. Will see how she does post-pacemaker.    VTE Prophylaxis - SCDs  Code Status - Full code  Disposition - Anticipate discharge TBD.      SRIRAM Rosales  [facility]  09/08/23  14:12 EDT

## 2023-09-08 NOTE — PLAN OF CARE
Goal Outcome Evaluation:              Outcome Evaluation: Patient heart rate has been down to 30's , family at bedside speak Khmer interperter linda used for translation. 2L NC placed due to sleep apnea. NPO after midnight. Maintain safety and continue to monitor.

## 2023-09-09 ENCOUNTER — APPOINTMENT (OUTPATIENT)
Dept: CARDIOLOGY | Facility: HOSPITAL | Age: 73
DRG: 243 | End: 2023-09-09
Payer: MEDICARE

## 2023-09-09 ENCOUNTER — READMISSION MANAGEMENT (OUTPATIENT)
Dept: CALL CENTER | Facility: HOSPITAL | Age: 73
End: 2023-09-09
Payer: MEDICARE

## 2023-09-09 ENCOUNTER — APPOINTMENT (OUTPATIENT)
Dept: CT IMAGING | Facility: HOSPITAL | Age: 73
DRG: 243 | End: 2023-09-09
Payer: MEDICARE

## 2023-09-09 VITALS
SYSTOLIC BLOOD PRESSURE: 141 MMHG | OXYGEN SATURATION: 97 % | HEART RATE: 98 BPM | HEIGHT: 60 IN | TEMPERATURE: 97.5 F | WEIGHT: 210 LBS | BODY MASS INDEX: 41.23 KG/M2 | RESPIRATION RATE: 16 BRPM | DIASTOLIC BLOOD PRESSURE: 76 MMHG

## 2023-09-09 PROBLEM — I44.1 SECOND DEGREE HEART BLOCK: Status: RESOLVED | Noted: 2023-09-07 | Resolved: 2023-09-09

## 2023-09-09 PROBLEM — R10.9 LEFT SIDED ABDOMINAL PAIN: Status: RESOLVED | Noted: 2023-09-08 | Resolved: 2023-09-09

## 2023-09-09 LAB
ANION GAP SERPL CALCULATED.3IONS-SCNC: 8 MMOL/L (ref 5–15)
ASCENDING AORTA: 3.1 CM
BH CV ECHO MEAS - ACS: 2.06 CM
BH CV ECHO MEAS - AO MAX PG: 19.3 MMHG
BH CV ECHO MEAS - AO MEAN PG: 9.7 MMHG
BH CV ECHO MEAS - AO ROOT DIAM: 2.7 CM
BH CV ECHO MEAS - AO V2 MAX: 219.9 CM/SEC
BH CV ECHO MEAS - AO V2 VTI: 43.8 CM
BH CV ECHO MEAS - AVA(I,D): 1.9 CM2
BH CV ECHO MEAS - EDV(CUBED): 89 ML
BH CV ECHO MEAS - EDV(MOD-SP2): 40 ML
BH CV ECHO MEAS - EDV(MOD-SP4): 97 ML
BH CV ECHO MEAS - EF(MOD-BP): 62.1 %
BH CV ECHO MEAS - EF(MOD-SP2): 55 %
BH CV ECHO MEAS - EF(MOD-SP4): 64.9 %
BH CV ECHO MEAS - ESV(CUBED): 29.5 ML
BH CV ECHO MEAS - ESV(MOD-SP2): 18 ML
BH CV ECHO MEAS - ESV(MOD-SP4): 34 ML
BH CV ECHO MEAS - FS: 30.8 %
BH CV ECHO MEAS - IVS/LVPW: 1.09 CM
BH CV ECHO MEAS - IVSD: 0.84 CM
BH CV ECHO MEAS - LAT PEAK E' VEL: 5.8 CM/SEC
BH CV ECHO MEAS - LV DIASTOLIC VOL/BSA (35-75): 50.9 CM2
BH CV ECHO MEAS - LV MASS(C)D: 113.3 GRAMS
BH CV ECHO MEAS - LV MAX PG: 10.6 MMHG
BH CV ECHO MEAS - LV MEAN PG: 4.4 MMHG
BH CV ECHO MEAS - LV SYSTOLIC VOL/BSA (12-30): 17.8 CM2
BH CV ECHO MEAS - LV V1 MAX: 163 CM/SEC
BH CV ECHO MEAS - LV V1 VTI: 32.3 CM
BH CV ECHO MEAS - LVIDD: 4.5 CM
BH CV ECHO MEAS - LVIDS: 3.1 CM
BH CV ECHO MEAS - LVOT AREA: 2.6 CM2
BH CV ECHO MEAS - LVOT DIAM: 1.81 CM
BH CV ECHO MEAS - LVPWD: 0.77 CM
BH CV ECHO MEAS - MED PEAK E' VEL: 5.8 CM/SEC
BH CV ECHO MEAS - MV A DUR: 0.17 SEC
BH CV ECHO MEAS - MV A MAX VEL: 158.3 CM/SEC
BH CV ECHO MEAS - MV DEC SLOPE: 331 CM/SEC2
BH CV ECHO MEAS - MV DEC TIME: 0.3 MSEC
BH CV ECHO MEAS - MV E MAX VEL: 94 CM/SEC
BH CV ECHO MEAS - MV E/A: 0.59
BH CV ECHO MEAS - MV MAX PG: 12.3 MMHG
BH CV ECHO MEAS - MV MEAN PG: 4.1 MMHG
BH CV ECHO MEAS - MV P1/2T: 102.1 MSEC
BH CV ECHO MEAS - MV V2 VTI: 43.4 CM
BH CV ECHO MEAS - MVA(P1/2T): 2.15 CM2
BH CV ECHO MEAS - MVA(VTI): 1.91 CM2
BH CV ECHO MEAS - PA ACC TIME: 0.12 SEC
BH CV ECHO MEAS - PA V2 MAX: 139.8 CM/SEC
BH CV ECHO MEAS - PULM A REVS DUR: 0.15 SEC
BH CV ECHO MEAS - PULM A REVS VEL: 30.9 CM/SEC
BH CV ECHO MEAS - PULM DIAS VEL: 25.2 CM/SEC
BH CV ECHO MEAS - PULM S/D: 1.51
BH CV ECHO MEAS - PULM SYS VEL: 38.1 CM/SEC
BH CV ECHO MEAS - RV MAX PG: 4.1 MMHG
BH CV ECHO MEAS - RV V1 MAX: 101.2 CM/SEC
BH CV ECHO MEAS - RV V1 VTI: 24.2 CM
BH CV ECHO MEAS - SI(MOD-SP2): 11.5 ML/M2
BH CV ECHO MEAS - SI(MOD-SP4): 33.1 ML/M2
BH CV ECHO MEAS - SV(LVOT): 82.9 ML
BH CV ECHO MEAS - SV(MOD-SP2): 22 ML
BH CV ECHO MEAS - SV(MOD-SP4): 63 ML
BH CV ECHO MEASUREMENTS AVERAGE E/E' RATIO: 16.21
BH CV LOWER VASCULAR LEFT COMMON FEMORAL AUGMENT: NORMAL
BH CV LOWER VASCULAR LEFT COMMON FEMORAL COMPETENT: NORMAL
BH CV LOWER VASCULAR LEFT COMMON FEMORAL COMPRESS: NORMAL
BH CV LOWER VASCULAR LEFT COMMON FEMORAL PHASIC: NORMAL
BH CV LOWER VASCULAR LEFT COMMON FEMORAL SPONT: NORMAL
BH CV LOWER VASCULAR LEFT DISTAL FEMORAL COMPRESS: NORMAL
BH CV LOWER VASCULAR LEFT GASTRONEMIUS COMPRESS: NORMAL
BH CV LOWER VASCULAR LEFT GREATER SAPH AK COMPRESS: NORMAL
BH CV LOWER VASCULAR LEFT GREATER SAPH BK COMPRESS: NORMAL
BH CV LOWER VASCULAR LEFT LESSER SAPH COMPRESS: NORMAL
BH CV LOWER VASCULAR LEFT MID FEMORAL AUGMENT: NORMAL
BH CV LOWER VASCULAR LEFT MID FEMORAL COMPETENT: NORMAL
BH CV LOWER VASCULAR LEFT MID FEMORAL COMPRESS: NORMAL
BH CV LOWER VASCULAR LEFT MID FEMORAL PHASIC: NORMAL
BH CV LOWER VASCULAR LEFT MID FEMORAL SPONT: NORMAL
BH CV LOWER VASCULAR LEFT PERONEAL COMPRESS: NORMAL
BH CV LOWER VASCULAR LEFT POPLITEAL AUGMENT: NORMAL
BH CV LOWER VASCULAR LEFT POPLITEAL COMPETENT: NORMAL
BH CV LOWER VASCULAR LEFT POPLITEAL COMPRESS: NORMAL
BH CV LOWER VASCULAR LEFT POPLITEAL PHASIC: NORMAL
BH CV LOWER VASCULAR LEFT POPLITEAL SPONT: NORMAL
BH CV LOWER VASCULAR LEFT POSTERIOR TIBIAL COMPRESS: NORMAL
BH CV LOWER VASCULAR LEFT PROFUNDA FEMORAL COMPRESS: NORMAL
BH CV LOWER VASCULAR LEFT PROXIMAL FEMORAL COMPRESS: NORMAL
BH CV LOWER VASCULAR LEFT SAPHENOFEMORAL JUNCTION COMPRESS: NORMAL
BH CV LOWER VASCULAR RIGHT COMMON FEMORAL AUGMENT: NORMAL
BH CV LOWER VASCULAR RIGHT COMMON FEMORAL COMPETENT: NORMAL
BH CV LOWER VASCULAR RIGHT COMMON FEMORAL COMPRESS: NORMAL
BH CV LOWER VASCULAR RIGHT COMMON FEMORAL PHASIC: NORMAL
BH CV LOWER VASCULAR RIGHT COMMON FEMORAL SPONT: NORMAL
BH CV LOWER VASCULAR RIGHT DISTAL FEMORAL COMPRESS: NORMAL
BH CV LOWER VASCULAR RIGHT GASTRONEMIUS COMPRESS: NORMAL
BH CV LOWER VASCULAR RIGHT GREATER SAPH AK COMPRESS: NORMAL
BH CV LOWER VASCULAR RIGHT GREATER SAPH BK COMPRESS: NORMAL
BH CV LOWER VASCULAR RIGHT LESSER SAPH COMPRESS: NORMAL
BH CV LOWER VASCULAR RIGHT MID FEMORAL AUGMENT: NORMAL
BH CV LOWER VASCULAR RIGHT MID FEMORAL COMPETENT: NORMAL
BH CV LOWER VASCULAR RIGHT MID FEMORAL COMPRESS: NORMAL
BH CV LOWER VASCULAR RIGHT MID FEMORAL PHASIC: NORMAL
BH CV LOWER VASCULAR RIGHT MID FEMORAL SPONT: NORMAL
BH CV LOWER VASCULAR RIGHT PERONEAL COMPRESS: NORMAL
BH CV LOWER VASCULAR RIGHT POPLITEAL AUGMENT: NORMAL
BH CV LOWER VASCULAR RIGHT POPLITEAL COMPETENT: NORMAL
BH CV LOWER VASCULAR RIGHT POPLITEAL COMPRESS: NORMAL
BH CV LOWER VASCULAR RIGHT POPLITEAL PHASIC: NORMAL
BH CV LOWER VASCULAR RIGHT POPLITEAL SPONT: NORMAL
BH CV LOWER VASCULAR RIGHT POSTERIOR TIBIAL COMPRESS: NORMAL
BH CV LOWER VASCULAR RIGHT PROFUNDA FEMORAL COMPRESS: NORMAL
BH CV LOWER VASCULAR RIGHT PROXIMAL FEMORAL COMPRESS: NORMAL
BH CV LOWER VASCULAR RIGHT SAPHENOFEMORAL JUNCTION COMPRESS: NORMAL
BH CV XLRA - TDI S': 15.7 CM/SEC
BUN SERPL-MCNC: 17 MG/DL (ref 8–23)
BUN/CREAT SERPL: 18.5 (ref 7–25)
CALCIUM SPEC-SCNC: 8.8 MG/DL (ref 8.6–10.5)
CHLORIDE SERPL-SCNC: 101 MMOL/L (ref 98–107)
CO2 SERPL-SCNC: 30 MMOL/L (ref 22–29)
CREAT SERPL-MCNC: 0.92 MG/DL (ref 0.57–1)
DEPRECATED RDW RBC AUTO: 43.5 FL (ref 37–54)
EGFRCR SERPLBLD CKD-EPI 2021: 65.1 ML/MIN/1.73
ERYTHROCYTE [DISTWIDTH] IN BLOOD BY AUTOMATED COUNT: 13.5 % (ref 12.3–15.4)
GLUCOSE BLDC GLUCOMTR-MCNC: 122 MG/DL (ref 70–130)
GLUCOSE BLDC GLUCOMTR-MCNC: 126 MG/DL (ref 70–130)
GLUCOSE SERPL-MCNC: 152 MG/DL (ref 65–99)
HCT VFR BLD AUTO: 45.6 % (ref 34–46.6)
HGB BLD-MCNC: 15.1 G/DL (ref 12–15.9)
MCH RBC QN AUTO: 29.2 PG (ref 26.6–33)
MCHC RBC AUTO-ENTMCNC: 33.1 G/DL (ref 31.5–35.7)
MCV RBC AUTO: 88 FL (ref 79–97)
PLATELET # BLD AUTO: 149 10*3/MM3 (ref 140–450)
PMV BLD AUTO: 11.9 FL (ref 6–12)
POTASSIUM SERPL-SCNC: 3.9 MMOL/L (ref 3.5–5.2)
QT INTERVAL: 442 MS
QTC INTERVAL: 477 MS
RBC # BLD AUTO: 5.18 10*6/MM3 (ref 3.77–5.28)
SODIUM SERPL-SCNC: 139 MMOL/L (ref 136–145)
WBC NRBC COR # BLD: 7.8 10*3/MM3 (ref 3.4–10.8)

## 2023-09-09 PROCEDURE — 85027 COMPLETE CBC AUTOMATED: CPT | Performed by: INTERNAL MEDICINE

## 2023-09-09 PROCEDURE — 99024 POSTOP FOLLOW-UP VISIT: CPT | Performed by: STUDENT IN AN ORGANIZED HEALTH CARE EDUCATION/TRAINING PROGRAM

## 2023-09-09 PROCEDURE — 80048 BASIC METABOLIC PNL TOTAL CA: CPT | Performed by: INTERNAL MEDICINE

## 2023-09-09 PROCEDURE — 25510000001 PERFLUTREN (DEFINITY) 8.476 MG IN SODIUM CHLORIDE (PF) 0.9 % 10 ML INJECTION: Performed by: INTERNAL MEDICINE

## 2023-09-09 PROCEDURE — 97161 PT EVAL LOW COMPLEX 20 MIN: CPT | Performed by: PHYSICAL THERAPIST

## 2023-09-09 PROCEDURE — 97166 OT EVAL MOD COMPLEX 45 MIN: CPT

## 2023-09-09 PROCEDURE — 94799 UNLISTED PULMONARY SVC/PX: CPT

## 2023-09-09 PROCEDURE — 94664 DEMO&/EVAL PT USE INHALER: CPT

## 2023-09-09 PROCEDURE — 93970 EXTREMITY STUDY: CPT

## 2023-09-09 PROCEDURE — 25010000002 HYDROMORPHONE PER 4 MG: Performed by: INTERNAL MEDICINE

## 2023-09-09 PROCEDURE — 93306 TTE W/DOPPLER COMPLETE: CPT | Performed by: INTERNAL MEDICINE

## 2023-09-09 PROCEDURE — 97530 THERAPEUTIC ACTIVITIES: CPT | Performed by: PHYSICAL THERAPIST

## 2023-09-09 PROCEDURE — 93306 TTE W/DOPPLER COMPLETE: CPT

## 2023-09-09 PROCEDURE — 74176 CT ABD & PELVIS W/O CONTRAST: CPT

## 2023-09-09 PROCEDURE — 94761 N-INVAS EAR/PLS OXIMETRY MLT: CPT

## 2023-09-09 PROCEDURE — 82948 REAGENT STRIP/BLOOD GLUCOSE: CPT

## 2023-09-09 RX ORDER — HYDRALAZINE HYDROCHLORIDE 50 MG/1
50 TABLET, FILM COATED ORAL EVERY 8 HOURS SCHEDULED
Qty: 90 TABLET | Refills: 1 | Status: SHIPPED | OUTPATIENT
Start: 2023-09-09 | End: 2023-09-19

## 2023-09-09 RX ADMIN — PANTOPRAZOLE SODIUM 40 MG: 40 TABLET, DELAYED RELEASE ORAL at 06:43

## 2023-09-09 RX ADMIN — PERFLUTREN 2 ML: 6.52 INJECTION, SUSPENSION INTRAVENOUS at 09:17

## 2023-09-09 RX ADMIN — HYDRALAZINE HYDROCHLORIDE 50 MG: 50 TABLET, FILM COATED ORAL at 06:43

## 2023-09-09 RX ADMIN — FLUTICASONE PROPIONATE 2 SPRAY: 50 SPRAY, METERED NASAL at 11:04

## 2023-09-09 RX ADMIN — FUROSEMIDE 40 MG: 40 TABLET ORAL at 10:59

## 2023-09-09 RX ADMIN — TOPIRAMATE 50 MG: 50 TABLET, FILM COATED ORAL at 10:59

## 2023-09-09 RX ADMIN — Medication 10 ML: at 11:06

## 2023-09-09 RX ADMIN — HYDROMORPHONE HYDROCHLORIDE 0.5 MG: 1 INJECTION, SOLUTION INTRAMUSCULAR; INTRAVENOUS; SUBCUTANEOUS at 04:27

## 2023-09-09 RX ADMIN — HYDRALAZINE HYDROCHLORIDE 50 MG: 50 TABLET, FILM COATED ORAL at 16:42

## 2023-09-09 RX ADMIN — Medication 3 ML: at 11:07

## 2023-09-09 RX ADMIN — CETIRIZINE HYDROCHLORIDE 10 MG: 10 TABLET ORAL at 10:59

## 2023-09-09 RX ADMIN — BUDESONIDE AND FORMOTEROL FUMARATE DIHYDRATE 2 PUFF: 160; 4.5 AEROSOL RESPIRATORY (INHALATION) at 07:17

## 2023-09-09 RX ADMIN — GABAPENTIN 100 MG: 100 CAPSULE ORAL at 06:43

## 2023-09-09 RX ADMIN — TIOTROPIUM BROMIDE INHALATION SPRAY 2 PUFF: 3.12 SPRAY, METERED RESPIRATORY (INHALATION) at 07:17

## 2023-09-09 NOTE — THERAPY EVALUATION
Patient Name: Camryn Dorsey  : 1950    MRN: [id number]                              Today's Date: 2023       Admit Date: 2023    Visit Dx:     ICD-10-CM ICD-9-CM   1. Lightheadedness  R42 780.4   2. Elevated blood pressure reading in office with diagnosis of hypertension  I10 401.9   3. Second degree heart block  I44.1 426.13     Patient Active Problem List   Diagnosis    Second degree heart block    HTN (hypertension)    Type 2 diabetes mellitus with hyperglycemia, without long-term current use of insulin    Dizziness    Symptomatic bradycardia    Venous insufficiency of both lower extremities    Left sided abdominal pain    COPD (chronic obstructive pulmonary disease)    Chronic back pain    Chronic headache    Suspected sleep apnea     Past Medical History:   Diagnosis Date    Chronic back pain 2023    Chronic headache 2023    COPD (chronic obstructive pulmonary disease) 2023    Diabetes mellitus     DM (diabetes mellitus) 2023    Suspected sleep apnea 2023    Venous insufficiency of both lower extremities 2023     History reviewed. No pertinent surgical history.   General Information       Row Name 23 140          Physical Therapy Time and Intention    Document Type evaluation  -     Mode of Treatment physical therapy  -       Row Name 23 140          General Information    Patient Profile Reviewed yes  -     Existing Precautions/Restrictions pacemaker;other (see comments)  -       Row Name 23 140          Living Environment    People in Home spouse;other relative(s);child(santosh), adult  -       Row Name 23 1407          Home Main Entrance    Number of Stairs, Main Entrance none  -       Row Name 23 140          Cognition    Orientation Status (Cognition) oriented x 3  -       Row Name 23 140          Safety Issues, Functional Mobility    Impairments Affecting Function (Mobility) endurance/activity  tolerance;strength  -               User Key  (r) = Recorded By, (t) = Taken By, (c) = Cosigned By      Initials Name Provider Type     Marshal Pruitt, PT DPT Physical Therapist                   Mobility       Row Name 09/09/23 1407          Bed Mobility    Bed Mobility bed mobility (all) activities  -     All Activities, [place] (Bed Mobility) minimum assist (75% patient effort)  -     Assistive Device (Bed Mobility) bed rails  -       Row Name 09/09/23 1407          Sit-Stand Transfer    Sit-Stand [place] (Transfers) minimum assist (75% patient effort)  -     Assistive Device (Sit-Stand Transfers) walker, front-wheeled  -       Row Name 09/09/23 1407          Gait/Stairs (Locomotion)    [place] Level (Gait) minimum assist (75% patient effort)  -     Assistive Device (Gait) walker, front-wheeled  -     Distance in Feet (Gait) 45  -     Deviations/Abnormal Patterns (Gait) antionette decreased;stride length decreased;gait speed decreased  -       Row Name 09/09/23 1407          Mobility    Extremity Weight-bearing Status left upper extremity  -     Left Upper Extremity (Weight-bearing Status) other (see comments)  pacemaker precautions  -               User Key  (r) = Recorded By, (t) = Taken By, (c) = Cosigned By      Initials Name Provider Type     Marshal Pruitt, PT DPT Physical Therapist                   Obj/Interventions       Row Name 09/09/23 1408          Range of Motion Comprehensive    General Range of Motion no range of motion deficits identified  -       Row Name 09/09/23 1408          Strength Comprehensive (MMT)    Comment, General Manual Muscle Testing (MMT) Assessment BLE MMT grossly 3+/5  -               User Key  (r) = Recorded By, (t) = Taken By, (c) = Cosigned By      Initials Name Provider Type    Marshal Olmedo, PT DPT Physical Therapist                   Goals/Plan       Row Name 09/09/23 1418          Bed Mobility Goal 1 (PT)     Activity/Assistive Device (Bed Mobility Goal 1, PT) bed mobility activities, all  -LC     [place] Level/Cues Needed (Bed Mobility Goal 1, PT) standby assist  -LC     Time Frame (Bed Mobility Goal 1, PT) 1 week  -LC       Row Name 09/09/23 1418          Transfer Goal 1 (PT)    Activity/Assistive Device (Transfer Goal 1, PT) transfers, all;walker, rolling  -LC     [place] Level/Cues Needed (Transfer Goal 1, PT) standby assist  -LC     Time Frame (Transfer Goal 1, PT) 1 week  -LC       Row Name 09/09/23 1418          Gait Training Goal 1 (PT)    Activity/Assistive Device (Gait Training Goal 1, PT) gait (walking locomotion);walker, rolling  -LC     [place] Level (Gait Training Goal 1, PT) standby assist  -LC     Distance (Gait Training Goal 1, PT) 150  -LC     Time Frame (Gait Training Goal 1, PT) 1 week  -LC               User Key  (r) = Recorded By, (t) = Taken By, (c) = Cosigned By      Initials Name Provider Type    Marshal Olmedo, PT DPT Physical Therapist                   Clinical Impression       Row Name 09/09/23 1409          Pain    Pretreatment Pain Rating 3/10  -LC     Posttreatment Pain Rating 3/10  -LC     Pain Location - chest  -LC     Pain Intervention(s) Medication (See MAR);Repositioned  -LC       Row Name 09/09/23 1409          Plan of Care Review    Plan of Care Reviewed With patient;family  -LC     Outcome Evaluation PT EVAL completed. Pt AO x 4 with s/p pacemaker. Pt used family member for translation. Pt transferred supine to sit with min x 1. Pt transferred sit to stand with min x 1 and RW. Pt ambulated 45 ft with RW and min x 1. Pt transferred sit to supine with min x 1. Pt requires skilled therapy due to decreased functional activity tolerance, decreased transfers, decreased ambulation. Recommend DC home with assist from family.  -LC               User Key  (r) = Recorded By, (t) = Taken By, (c) = Cosigned By      Initials Name Provider Type    Marshal Olmedo, PT DPT  Physical Therapist                   Outcome Measures       Row Name 09/09/23 1418          How much help from another person do you currently need...    Turning from your back to your side while in flat bed without using bedrails? 3  -LC     Moving from lying on back to sitting on the side of a flat bed without bedrails? 3  -LC     Moving to and from a bed to a chair (including a wheelchair)? 3  -LC     Standing up from a chair using your arms (e.g., wheelchair, bedside chair)? 3  -LC     Climbing 3-5 steps with a railing? 2  -LC     To walk in hospital room? 3  -LC     AM-PAC 6 Clicks Score (PT) 17  -     Highest level of mobility 5 --> Static standing  -       Row Name 09/09/23 1418 09/09/23 1212       Functional Assessment    Outcome Measure Options AM-PAC 6 Clicks Basic Mobility (PT)  - AM-PAC 6 Clicks Daily Activity (OT)  -CE              User Key  (r) = Recorded By, (t) = Taken By, (c) = Cosigned By      Initials Name Provider Type     Marshal Pruitt, PT DPT Physical Therapist    Tatianna Alejandro, OT Occupational Therapist                                 Physical Therapy Education       Title: PT OT SLP Therapies (Done)       Topic: Physical Therapy (Done)       Point: Mobility training (Done)       Learning Progress Summary             Patient Acceptance, E,D, DU,VU by  at 9/9/2023 1418                         Point: Home exercise program (Done)       Learning Progress Summary             Patient Acceptance, E,D, DU,VU by  at 9/9/2023 1418                         Point: Body mechanics (Done)       Learning Progress Summary             Patient Acceptance, E,D, DU,VU by  at 9/9/2023 1418                         Point: Precautions (Done)       Learning Progress Summary             Patient Acceptance, E,D, DU,VU by  at 9/9/2023 1418                                         User Key       Initials Effective Dates Name Provider Type [facility] 07/11/23 -  Marshal Pruitt, PT DPT Physical  Therapist PT                  PT Recommendation and Plan     Plan of Care Reviewed With: patient, family  Outcome Evaluation: PT EVAL completed. Pt AO x 4 with s/p pacemaker. Pt used family member for translation. Pt transferred supine to sit with min x 1. Pt transferred sit to stand with min x 1 and RW. Pt ambulated 45 ft with RW and min x 1. Pt transferred sit to supine with min x 1. Pt requires skilled therapy due to decreased functional activity tolerance, decreased transfers, decreased ambulation. Recommend DC home with assist from family.     Time Calculation:         PT Charges       Row Name 09/09/23 1419             Time Calculation    Start Time 1340  -LC      Stop Time 1420  -LC      Time Calculation (min) 40 min  -LC      PT Received On 09/09/23  -LC      PT - Next Appointment 09/11/23  -      PT Goal Re-Cert Due Date 09/16/23  -         Time Calculation- PT    Total Timed Code Minutes- PT 40 minute(s)  -LC         Timed Charges    97769 - PT Therapeutic Activity Minutes 25  -LC         Total Minutes    Timed Charges Total Minutes 25  -LC       Total Minutes 25  -LC                User Key  (r) = Recorded By, (t) = Taken By, (c) = Cosigned By      Initials Name Provider Type    LC Marshal Pruitt, PT DPT Physical Therapist                  Therapy Charges for Today       Code Description Service Date Service Provider Modifiers Qty    08220759643 HC PT THERAPEUTIC ACT EA 15 MIN 9/9/2023 Marshal Pruitt, PT DPT GP 2    07203213969 HC PT EVAL LOW COMPLEXITY 1 9/9/2023 Marshal Pruitt, PT DPT GP 1            PT G-Codes  Outcome Measure Options: AM-PAC 6 Clicks Basic Mobility (PT)  AM-PAC 6 Clicks Score (PT): 17  AM-PAC 6 Clicks Score (OT): 20       Marshal Pruitt PT DPT  9/9/2023

## 2023-09-09 NOTE — PLAN OF CARE
Goal Outcome Evaluation:  Plan of Care Reviewed With: patient         No acute distress noted this shift, family at bedside, pacer insertion site clean, no drainage and color is good, medicated for incision pain and neck discomfort, safety maintained, continue plan of care

## 2023-09-09 NOTE — THERAPY EVALUATION
Patient Name: Camryn Dorsey  : 1950    MRN: [id number]                              Today's Date: 2023       Admit Date: 2023    Visit Dx:     ICD-10-CM ICD-9-CM   1. Lightheadedness  R42 780.4   2. Elevated blood pressure reading in office with diagnosis of hypertension  I10 401.9   3. Second degree heart block  I44.1 426.13     Patient Active Problem List   Diagnosis    Second degree heart block    HTN (hypertension)    Type 2 diabetes mellitus with hyperglycemia, without long-term current use of insulin    Dizziness    Symptomatic bradycardia    Venous insufficiency of both lower extremities    Left sided abdominal pain    COPD (chronic obstructive pulmonary disease)    Chronic back pain    Chronic headache    Suspected sleep apnea     Past Medical History:   Diagnosis Date    Chronic back pain 2023    Chronic headache 2023    COPD (chronic obstructive pulmonary disease) 2023    Diabetes mellitus     DM (diabetes mellitus) 2023    Suspected sleep apnea 2023    Venous insufficiency of both lower extremities 2023     History reviewed. No pertinent surgical history.   General Information       Row Name 23 1157          OT Time and Intention    Document Type evaluation  -CE     Mode of Treatment occupational therapy  -CE       Row Name 23 1157          General Information    Patient Profile Reviewed yes  -CE     Prior Level of Function min assist:;transfer;ADL's  has aides that come and assist with shower transfers and shower; spouse assists occasionally with LB ADLs  -CE     Existing Precautions/Restrictions pacemaker;other (see comments)  educated pt on pacer precautions and pt requiring cues to maintain during mobility  -CE       Row Name 23 1157          Living Environment    People in Home spouse;other (see comments)  mutliple family members present and stating they will be available to assist at d/c as well  -CE       Row Name  09/09/23 1157          Home Main Entrance    Number of Stairs, Main Entrance none  elevator access  -CE       Row Name 09/09/23 1157          Cognition    Orientation Status (Cognition) oriented x 3  -CE       Row Name 09/09/23 1157          Safety Issues, Functional Mobility    Impairments Affecting Function (Mobility) endurance/activity tolerance;strength  -CE               User Key  (r) = Recorded By, (t) = Taken By, (c) = Cosigned By      Initials Name Provider Type    CE Tatianna Garcia OT Occupational Therapist                     Mobility/ADL's       Row Name 09/09/23 1200          Bed Mobility    Bed Mobility rolling right;supine-sit  -CE     Rolling Right [place] (Bed Mobility) moderate assist (50% patient effort)  -CE     Supine-Sit [place] (Bed Mobility) moderate assist (50% patient effort);verbal cues;1 person assist  -CE     Comment, (Bed Mobility) cues for not pulling with LUE  -CE       Row Name 09/09/23 1200          Transfers    Transfers sit-stand transfer;stand-sit transfer  -CE       Row Name 09/09/23 1200          Sit-Stand Transfer    Sit-Stand [place] (Transfers) verbal cues;1 person assist;minimum assist (75% patient effort)  -CE     Assistive Device (Sit-Stand Transfers) walker, front-wheeled  -CE       Row Name 09/09/23 1200          Stand-Sit Transfer    Stand-Sit [place] (Transfers) verbal cues;1 person assist;contact guard  -CE     Assistive Device (Stand-Sit Transfers) walker, front-wheeled  -CE       Row Name 09/09/23 1200          Functional Mobility    Functional Mobility- Comment Pt able to ambulate around foot of bed with CGA x 1 using FWW and with min cues for positioning of AD.  -CE       Row Name 09/09/23 1200          Activities of Daily Living    BADL Assessment/Intervention lower body dressing  -CE       Row Name 09/09/23 1200          Mobility    Extremity Weight-bearing Status left upper extremity  -CE     Left Upper Extremity (Weight-bearing Status)  other (see comments)  pacemaker precautions; educated on no pushing/pulling and raising above 90 and to clarify with surgeon for length of time  -CE       Row Name 09/09/23 1200          Lower Body Dressing Assessment/Training    [place] Level (Lower Body Dressing) don;shoes/slippers;moderate assist (50% patient effort)  assist from family  -CE     Position (Lower Body Dressing) edge of bed sitting  -CE               User Key  (r) = Recorded By, (t) = Taken By, (c) = Cosigned By      Initials Name Provider Type     Tatianna Garcia OT Occupational Therapist                   Obj/Interventions       Row Name 09/09/23 1206          Sensory Assessment (Somatosensory)    Sensory Assessment (Somatosensory) sensation intact  -       Row Name 09/09/23 1206          Vision Assessment/Intervention    Visual Impairment/Limitations WFL  -CE       [facility] Name 09/09/23 1206          Range of Motion Comprehensive    Comment, General Range of Motion RUE and BLE WFL; L sh limited to 90 2/2 precautions  -       Row Name 09/09/23 1206          Strength Comprehensive (MMT)    Comment, General Manual Muscle Testing (MMT) Assessment BLE grossly 3+/5 and RUE 3+/5, LUE NT  -CE       Row Name 09/09/23 1206          Motor Skills    Motor Skills functional endurance  -     Functional Endurance fair  -CE       Row Name 09/09/23 1206          Balance    Balance Assessment standing dynamic balance  -CE     Dynamic Standing Balance contact guard;minimal assist;1-person assist  -CE     Position/Device Used, Standing Balance walker, front-wheeled  -CE               User Key  (r) = Recorded By, (t) = Taken By, (c) = Cosigned By      Initials Name Provider Type    Tatianna Alejandro OT Occupational Therapist                   Goals/Plan       Row Name 09/09/23 1211          Transfer Goal 1 (OT)    Activity/Assistive Device (Transfer Goal 1, OT) transfers, all  -CE     [place] Level/Cues Needed (Transfer Goal 1, OT) standby assist   -CE     Time Frame (Transfer Goal 1, OT) short term goal (STG);2 weeks  -CE       Row Name 09/09/23 1211          Dressing Goal 1 (OT)    Activity/Device (Dressing Goal 1, OT) dressing skills, all  -CE     [place]/Cues Needed (Dressing Goal 1, OT) minimum assist (75% or more patient effort)  -CE     Time Frame (Dressing Goal 1, OT) short term goal (STG);2 weeks  -CE       Row Name 09/09/23 1211          Toileting Goal 1 (OT)    Activity/Device (Toileting Goal 1, OT) toileting skills, all  -CE     [place] Level/Cues Needed (Toileting Goal 1, OT) standby assist  -CE     Time Frame (Toileting Goal 1, OT) short term goal (STG);2 weeks  -CE       Row Name 09/09/23 1211          Problem Specific Goal 1 (OT)    Problem Specific Goal 1 (OT) Pt will demonstrate understanding of pacer precautions during 3/3 ADL tasks without verbal cueing.  -CE     Time Frame (Problem Specific Goal 1, OT) short term goal (STG);2 weeks  -CE       Row Name 09/09/23 1211          Therapy Assessment/Plan (OT)    Planned Therapy Interventions (OT) activity tolerance training;adaptive equipment training;BADL retraining;functional balance retraining;transfer/mobility retraining;strengthening exercise;patient/caregiver education/training  -CE               User Key  (r) = Recorded By, (t) = Taken By, (c) = Cosigned By      Initials Name Provider Type    CE Tatianna Garcia, OT Occupational Therapist                   Clinical Impression       Row Name 09/09/23 1208          Pain Assessment    Pretreatment Pain Rating 3/10  -CE     Posttreatment Pain Rating 3/10  -CE     Pain Location - abdomen  -CE     Pre/Posttreatment Pain Comment leg cramping  -CE     Pain Intervention(s) Rest;Repositioned  -CE       Row Name 09/09/23 1208          Plan of Care Review    Plan of Care Reviewed With patient;son  -CE     Outcome Evaluation Pt seen for OT eval after admission for symptomatic bradycardia now s/p pacemaker placement on 9/8. Pts family  present and son able to translate (declining interpretor at this time). Pt able to complete bed mobility with modA and STS with [place] x 1 using FWW. Pt ambulated around foot of bed to chair with min/CGA x 1 using FWW and reporting slight dizziness. Pt and family educated on pacemaker precautions and family stating pt would like to return to home with spouse and that family will assist as needed. OT will con't to follow for stated goals and anticipate no f/u OT needs.  -CE       Row Name 09/09/23 1208          Therapy Assessment/Plan (OT)    Rehab Potential (OT) good, to achieve stated therapy goals  -CE     Criteria for Skilled Therapeutic Interventions Met (OT) yes  -CE     Therapy Frequency (OT) 3 times/wk  -CE       Row Name 09/09/23 1208          Therapy Plan Review/Discharge Plan (OT)    Anticipated Discharge Disposition (OT) home with assist  -CE       Row Name 09/09/23 1208          Vital Signs    O2 Delivery Pre Treatment supplemental O2  -CE     O2 Delivery Intra Treatment supplemental O2  -CE     O2 Delivery Post Treatment supplemental O2  -CE     Pre Patient Position Supine  -CE     Intra Patient Position Standing  -CE     Post Patient Position Sitting  -CE       Row Name 09/09/23 1208          Positioning and Restraints    Pre-Treatment Position in bed  -CE     Post Treatment Position chair  -CE     In Chair notified nsg;reclined;call light within reach;encouraged to call for assist;exit alarm on  -CE               User Key  (r) = Recorded By, (t) = Taken By, (c) = Cosigned By      Initials Name Provider Type    CE Tatianna Garcia, OT Occupational Therapist                   Outcome Measures       Row Name 09/09/23 1212          How much help from another is currently needed...    Putting on and taking off regular lower body clothing? 3  -CE     Bathing (including washing, rinsing, and drying) 3  -CE     Toileting (which includes using toilet bed pan or urinal) 3  -CE     Putting on and taking off  regular upper body clothing 3  -CE     Taking care of personal grooming (such as brushing teeth) 4  -CE     Eating meals 4  -CE     AM-PAC 6 Clicks Score (OT) 20  -CE       Row Name 09/09/23 1212          Functional Assessment    Outcome Measure Options AM-PAC 6 Clicks Daily Activity (OT)  -CE               User Key  (r) = Recorded By, (t) = Taken By, (c) = Cosigned By      Initials Name Provider Type    CE Tatianna Garcia OT Occupational Therapist                    Occupational Therapy Education       Title: PT OT SLP Therapies (Done)       Topic: Occupational Therapy (Done)       Point: ADL training (Done)       Description:   Instruct learner(s) on proper safety adaptation and remediation techniques during self care or transfers.   Instruct in proper use of assistive devices.                  Learning Progress Summary             Patient Acceptance, E, VU,NR by CE at 9/9/2023 1213   Family Acceptance, E, VU,NR by CE at 9/9/2023 1213                         Point: Home exercise program (Done)       Description:   Instruct learner(s) on appropriate technique for monitoring, assisting and/or progressing therapeutic exercises/activities.                  Learning Progress Summary             Patient Acceptance, E, VU,NR by CE at 9/9/2023 1213   Family Acceptance, E, VU,NR by CE at 9/9/2023 1213                         Point: Precautions (Done)       Description:   Instruct learner(s) on prescribed precautions during self-care and functional transfers.                  Learning Progress Summary             Patient Acceptance, E, VU,NR by CE at 9/9/2023 1213   Family Acceptance, E, VU,NR by CE at 9/9/2023 1213                         Point: Body mechanics (Done)       Description:   Instruct learner(s) on proper positioning and spine alignment during self-care, functional mobility activities and/or exercises.                  Learning Progress Summary             Patient Acceptance, E, VU,NR by CE at 9/9/2023 1213    Family Acceptance, E, VU,NR by CE at 9/9/2023 1213                                         User Key       Initials Effective Dates Name Provider Type Discipline    CE 10/17/22 -  Tatianna Garcia, HERMELINDA Occupational Therapist OT                  OT Recommendation and Plan  Planned Therapy Interventions (OT): activity tolerance training, adaptive equipment training, BADL retraining, functional balance retraining, transfer/mobility retraining, strengthening exercise, patient/caregiver education/training  Therapy Frequency (OT): 3 times/wk  Plan of Care Review  Plan of Care Reviewed With: patient, son  Outcome Evaluation: Pt seen for OT eval after admission for symptomatic bradycardia now s/p pacemaker placement on 9/8. Pts family present and son able to translate (declining interpretor at this time). Pt able to complete bed mobility with modA and STS with [place] x 1 using FWW. Pt ambulated around foot of bed to chair with min/CGA x 1 using FWW and reporting slight dizziness. Pt and family educated on pacemaker precautions and family stating pt would like to return to home with spouse and that family will assist as needed. OT will con't to follow for stated goals and anticipate no f/u OT needs.     Time Calculation:   Evaluation Complexity (OT)  Review Occupational Profile/Medical/Therapy History Complexity: expanded/moderate complexity  Assessment, Occupational Performance/Identification of Deficit Complexity: 3-5 performance deficits  Clinical Decision Making Complexity (OT): detailed assessment/moderate complexity  Overall Complexity of Evaluation (OT): moderate complexity     Time Calculation- OT       Row Name 09/09/23 1213             Time Calculation- OT    OT Start Time 1028  -CE      OT Stop Time 1050  -CE      OT Time Calculation (min) 22 min  -CE      OT Received On 09/09/23  -      OT - Next Appointment 09/11/23  -CE      OT Goal Re-Cert Due Date 09/23/23  -                User Key  (r) = Recorded By, (t)  = Taken By, (c) = Cosigned By      Initials Name Provider Type    Tatianna Alejandro OT Occupational Therapist                  Therapy Charges for Today       Code Description Service Date Service Provider Modifiers Qty    69795385618  OT EVAL MOD COMPLEXITY 2 9/9/2023 Tatianna Garcia OT GO 1                 Tatianna Garcia OT  9/9/2023

## 2023-09-09 NOTE — DISCHARGE SUMMARY
Patient Name: Camryn Dorsey  : 1950  MRN: [id number]    Date of Admission: 2023  Date of Discharge:  2023  Primary Care Physician: Provider, No Known      Chief Complaint:   Dizziness and Fatigue      Discharge Diagnoses     Active Hospital Problems    Diagnosis  POA    **Symptomatic bradycardia [R00.1]  Yes    Venous insufficiency of both lower extremities [I87.2]  Yes    COPD (chronic obstructive pulmonary disease) [J44.9]  Yes    Chronic back pain [M54.9, G89.29]  Yes    Chronic headache [R51.9, G89.29]  Yes    Suspected sleep apnea [R29.818]  Yes    HTN (hypertension) [I10]  Yes    Type 2 diabetes mellitus with hyperglycemia, without long-term current use of insulin [E11.65]  Yes    Dizziness [R42]  Yes      Resolved Hospital Problems    Diagnosis Date Resolved POA    Left sided abdominal pain [R10.9] 2023 Yes    Second degree heart block [I44.1] 2023 Yes        Hospital Course     Ms. Dorsey is a 73 y.o. female with a history of DM2, HTN, COPD who presented to [facility] initially complaining of dizziness and fatigue.  Please see the admitting history and physical for further details.  She was noted to have elevated blood pressure as well as secondary Mobitz type II AV block.  She was admitted to our service and beta-blocker was held.  Cardiology saw in consultation and after discussion with she and her family decided to proceed with pacemaker placement.  This was performed yesterday afternoon and she tolerated it well.  She has had minimal pain since placement of the pacemaker.  Beta-blocker was held on admission and I discussed with cardiology who recommended continue to hold for now with outpatient follow-up and consideration for restarting at that time.  She has been receiving hydralazine here with fair blood pressure control so we will continue this.  She will continue on her home Lasix dose.  She has chronic lower extremity edema with Dopplers  negative here.  Continuing compression stockings and Lasix at home.  She had a vague complaint of left-sided abdominal pain which seem to be more musculoskeletal based on family history which stated that it occurred when she bent over or twisted.  CT abdomen pelvis does not show any acute abdominal issue.  She appears stable for discharge at this time and has been cleared to go home by cardiology.  All the above was discussed with the patient with her family acting as  and she indicated she understood fully and family asked appropriate questions.        Day of Discharge     Subjective:  No new complaints.  She is feeling better    Physical Exam:  Temp:  [97.3 °F (36.3 °C)-98.4 °F (36.9 °C)] 97.5 °F (36.4 °C)  Heart Rate:  [62-98] 98  Resp:  [10-16] 16  BP: (109-141)/(61-78) 141/76  Body mass index is 41.01 kg/m².  Physical Exam  Vitals reviewed.   Constitutional:       General: She is not in acute distress.     Appearance: She is obese.   Cardiovascular:      Rate and Rhythm: Normal rate and regular rhythm.   Pulmonary:      Effort: Pulmonary effort is normal. No respiratory distress.      Breath sounds: Normal breath sounds.   Abdominal:      General: There is no distension.      Palpations: Abdomen is soft.      Tenderness: There is no abdominal tenderness.   Musculoskeletal:      Right lower leg: Edema present.      Left lower leg: Edema present.   Skin:     General: Skin is warm and dry.   Neurological:      Mental Status: She is alert and oriented to person, place, and time.   Psychiatric:         Mood and Affect: Mood normal.       Consultants     Consult Orders (all) (From admission, onward)       Start     Ordered    09/07/23 1716  [facility] (on-call MD unless specified) Details  Once        Specialty:  Hospitalist  Provider:  (Not yet assigned)    09/07/23 1715 09/07/23 1716  Cardiology (on-call MD unless specified)  Once        Specialty:  Cardiology  Provider:  (Not yet assigned)    09/07/23 1715                   Procedures     Pacemaker DC new--Medtronic    Imaging Results (All)       Procedure Component Value Units Date/Time    CT Abdomen Pelvis Without Contrast [[id number]] Collected: 09/09/23 0849     Updated: 09/09/23 0857    Narrative:      CT ABDOMEN PELVIS WO CONTRAST-     INDICATIONS: Pain     TECHNIQUE: Radiation dose reduction techniques were utilized, including  automated exposure control and exposure modulation based on body size.  Unenhanced ABDOMEN AND PELVIS CT     COMPARISON: None available     FINDINGS:     The gallbladder is surgically absent.     Contrast material is evident in the urinary collecting system. Left  renal cyst is seen.     Otherwise unremarkable unenhanced appearance of the liver, spleen,  adrenal glands, pancreas, kidneys, bladder. And intrauterine device is  apparent in the uterus.     No bowel obstruction or abnormal bowel thickening is identified.     No free intraperitoneal gas or free fluid.     Scattered small mesenteric and para-aortic lymph nodes are seen that are  not significant by size criteria.     Abdominal aorta is not aneurysmal. Aortic and other arterial  calcifications are present.     The lung bases show minimal atelectasis. The heart size appears  borderline. Moderate coronary arterial calcification is apparent.     Degenerative changes are seen in the spine. No acute fracture is  identified.             Impression:            1. No acute inflammatory process of bowel is identified, follow-up as  indications persist.     2. No obstructive uropathy.           This report was finalized on 9/9/2023 8:54 AM by Dr. Robin Watt M.D.       XR Chest 1 View [[id number]] Collected: 09/08/23 2037     Updated: 09/08/23 2046    Narrative:      XR CHEST 1 VW-     HISTORY: Female who is 73 years-old, pacemaker placement     TECHNIQUE: Frontal view of the chest     COMPARISON: 9/7/2023     FINDINGS: Left-sided pacemaker and cardiac leads are seen. The heart  is  enlarged. Pulmonary vasculature is unremarkable. Lung volume is low. No  focal pulmonary consolidation, pleural effusion, or pneumothorax. No  acute osseous process.       Impression:      Pacemaker placement. No pneumothorax.     This report was finalized on 9/8/2023 8:43 PM by Dr. Robin Watt M.D.       XR Chest 1 View [[id number]] Collected: 09/07/23 1843     Updated: 09/07/23 1846    Narrative:      Clinical: Lightheaded     COMPARISON 11/20/2021     FINDINGS: There is cardiac enlargement as before. The mediastinum is  stable. No effusion, edema or acute airspace disease is demonstrated.     CONCLUSION: Stable cardiomegaly.     This report was finalized on 9/7/2023 6:43 PM by Dr. Jah Ingram M.D.             Results for orders placed during the hospital encounter of 09/07/23    Duplex Venous Lower Extremity - Bilateral CAR    Interpretation Summary    Normal bilateral lower extremity venous duplex scan.    Results for orders placed during the hospital encounter of 09/07/23    Adult Transthoracic Echo Complete w/ Color, Spectral and Contrast if Necessary Per Protocol    Interpretation Summary    Left ventricular systolic function is hyperdynamic (EF > 70%).    Left ventricular diastolic function is consistent with (grade Ia w/high LAP) impaired relaxation    Normal right ventricular cavity size noted. Hyperdynamic right ventricular systolic function noted.    The left atrial cavity is mildly dilated.    There is moderate to severe mitral annular calcification    Mild mitral valve stenosis is present. The mitral valve mean gradient is 4.1 mmHg    Insufficient TR velocity profile to estimate the right ventricular systolic pressure.    There is no evidence of pericardial effusion.    Pertinent Labs     Results from last 7 days   Lab Units 09/09/23  0504 09/08/23  0426 09/07/23  1509   WBC 10*3/mm3 7.80 6.85 8.11   HEMOGLOBIN g/dL 15.1 14.0 14.5   PLATELETS 10*3/mm3 149 158 167     Results from last 7  days   Lab Units 09/09/23  0504 09/08/23  0426 09/07/23  1601   SODIUM mmol/L 139 141 140   POTASSIUM mmol/L 3.9 3.8 4.4   CHLORIDE mmol/L 101 102 103   CO2 mmol/L 30.0* 30.3* 26.0   BUN mg/dL 17 16 19   CREATININE mg/dL 0.92 0.84 0.86   GLUCOSE mg/dL 152* 145* 155*   EGFR mL/min/1.73 65.1 72.6 70.6     Results from last 7 days   Lab Units 09/08/23  0426 09/07/23  1601   ALBUMIN g/dL 4.1 4.1   BILIRUBIN mg/dL 0.4 0.4   ALK PHOS U/L 80 82   AST (SGOT) U/L 23 20   ALT (SGPT) U/L 26 28     Results from last 7 days   Lab Units 09/09/23  0504 09/08/23  0426 09/07/23  1601   CALCIUM mg/dL 8.8 9.0 9.5   ALBUMIN g/dL  --  4.1 4.1   MAGNESIUM mg/dL  --   --  2.9*       Results from last 7 days   Lab Units 09/07/23  1907 09/07/23  1601   HSTROP T ng/L 20* 18*           Invalid input(s): LDLCALC          Test Results Pending at Discharge       Discharge Details        Discharge Medications        New Medications        Instructions Start Date   hydrALAZINE 50 MG tablet  Commonly known as: APRESOLINE   50 mg, Oral, Every 8 Hours Scheduled             Continue These Medications        Instructions Start Date   albuterol sulfate  (90 Base) MCG/ACT inhaler  Commonly known as: PROVENTIL HFA;VENTOLIN HFA;PROAIR HFA   2 puffs, Inhalation, Every 4 Hours PRN      baclofen 10 MG tablet  Commonly known as: LIORESAL   10 mg, Oral, 3 Times Daily PRN      Crestor 40 MG tablet  Generic drug: rosuvastatin   40 mg, Oral, Nightly      diclofenac-miSOPROStol 50-0.2 MG EC tablet  Commonly known as: ARTHROTEC 50   1 tablet, Oral, 2 Times Daily      Farxiga 10 MG tablet  Generic drug: dapagliflozin Propanediol   10 mg, Oral, Daily      fluticasone 50 MCG/ACT nasal spray  Commonly known as: FLONASE   2 sprays, Nasal, Daily      furosemide 40 MG tablet  Commonly known as: LASIX   40 mg, Oral, Daily      gabapentin 300 MG capsule  Commonly known as: NEURONTIN   300 mg, Oral, Every Morning      gabapentin 300 MG capsule  Commonly known as:  NEURONTIN   600 mg, Oral, Nightly      guaifenesin-dextromethorphan  MG tablet sustained-release 12 hour tablet   1 tablet, Oral, 2 Times Daily PRN      loratadine 10 MG tablet  Commonly known as: CLARITIN   10 mg, Oral, Daily      metFORMIN 1000 MG tablet  Commonly known as: GLUCOPHAGE   1,000 mg, Oral, 2 Times Daily With Meals      omeprazole 20 MG capsule  Commonly known as: priLOSEC   20 mg, Oral, Daily PRN      topiramate 50 MG tablet  Commonly known as: TOPAMAX   50 mg, Oral, 2 Times Daily      Trelegy Ellipta 200-62.5-25 MCG/ACT aerosol powder   Generic drug: Fluticasone-Umeclidin-Vilant   1 puff, Inhalation, Daily      Trulicity 1.5 MG/0.5ML solution pen-injector  Generic drug: Dulaglutide   1.5 mg, Subcutaneous, Weekly             Stop These Medications      metoprolol tartrate 100 MG tablet  Commonly known as: LOPRESSOR              No Known Allergies    Discharge Disposition:  Home or Self Care      Discharge Diet:  Diet Order   Procedures    Diet: Diabetic Diets; Consistent Carbohydrate; Texture: Regular Texture (IDDSI 7); Fluid Consistency: Thin (IDDSI 0)       Discharge Activity:       CODE STATUS:    Code Status and Medical Interventions:   Ordered at: 09/07/23 1952     Code Status (Patient has no pulse and is not breathing):    CPR (Attempt to Resuscitate)     Medical Interventions (Patient has pulse or is breathing):    Full Support       No future appointments.   Follow-up Information       Provider, No Known .    Contact information:  [facility] [address] [phone]                             Time Spent on Discharge:  Greater than 30 minutes      Familia Singer MD  [facility]  09/09/23  15:55 EDT

## 2023-09-09 NOTE — PLAN OF CARE
Goal Outcome Evaluation:  Plan of Care Reviewed With: patient      Outcome Evaluation: Patient being discharged home. IV and heart monitor removed. Patient and family verbalize understanding.

## 2023-09-09 NOTE — PROGRESS NOTES
"    Patient Name: Camryn Dorsey  :1950  73 y.o.      Patient Care Team:  Provider, No Known as PCP - General    Chief Complaint:   Dizziness    Interval History:   Post pacemaker 2023, currently feeling well.  Daughter at bedside.    Objective   Vital Signs  Temp:  [97.3 °F (36.3 °C)-98.4 °F (36.9 °C)] 97.5 °F (36.4 °C)  Heart Rate:  [62-98] 98  Resp:  [10-16] 16  BP: (109-141)/(61-78) 141/76    Intake/Output Summary (Last 24 hours) at 2023 1506  Last data filed at 2023 0432  Gross per 24 hour   Intake 100 ml   Output 1450 ml   Net -1350 ml     Flowsheet Rows      Flowsheet Row First Filed Value   Admission Height 152.4 cm (60\") Documented at 2023 1605   Admission Weight 95.3 kg (210 lb) Documented at 2023 1605            GEN: no distress, alert and oriented  HEENT: NACT, EOMI, moist mucous membranes  Lungs: CTAB, no wheezes, rales or rhonchi  CV: normal rate, regular rhythm, normal S1, S2, no murmurs, peacemaker site CDI, no hematoma, mild tenderness to palpation  Abdomen: soft, nontender, nondistended, NABS  Extremities: no edema  Skin: no rash, warm, dry  Heme/Lymph: no bruising  Psych: organized thought, normal behavior and affect    Results Review:    Results from last 7 days   Lab Units 23  0504   SODIUM mmol/L 139   POTASSIUM mmol/L 3.9   CHLORIDE mmol/L 101   CO2 mmol/L 30.0*   BUN mg/dL 17   CREATININE mg/dL 0.92   GLUCOSE mg/dL 152*   CALCIUM mg/dL 8.8     Results from last 7 days   Lab Units 23  1907 23  1601   HSTROP T ng/L 20* 18*     Results from last 7 days   Lab Units 23  0504   WBC 10*3/mm3 7.80   HEMOGLOBIN g/dL 15.1   HEMATOCRIT % 45.6   PLATELETS 10*3/mm3 149         Results from last 7 days   Lab Units 23  1601   MAGNESIUM mg/dL 2.9*                   Medication Review:   budesonide-formoterol, 2 puff, Inhalation, BID - RT   And  tiotropium bromide monohydrate, 2 puff, Inhalation, Daily - RT  cetirizine, 10 mg, Oral, " Daily  fluticasone, 2 spray, Nasal, Daily  furosemide, 40 mg, Oral, Daily  gabapentin, 100 mg, Oral, QAM  gabapentin, 300 mg, Oral, Nightly  hydrALAZINE, 50 mg, Oral, Q8H  insulin lispro, 2-9 Units, Subcutaneous, 4x Daily AC & at Bedtime  pantoprazole, 40 mg, Oral, Q AM  rosuvastatin, 40 mg, Oral, Nightly  senna-docusate sodium, 2 tablet, Oral, BID  sodium chloride, 10 mL, Intravenous, Q12H  sodium chloride, 3 mL, Intravenous, Q12H  topiramate, 50 mg, Oral, BID              Assessment & Plan   #Second-degree heart block  #Hypertension  #Diabetes  #Hyperglycemia  #Dizziness    73-year-old woman with hypertension, diabetes who presented with lethargy, found to have Wenke Bach with concern for possible high degree AV block.  She underwent pacemaker implantation 9/8/2023.    Stable for discharge home with outpatient follow-up in 1 to 2 weeks.      Petr Jauregui MD  [facility]  09/09/23  15:06 EDT

## 2023-09-09 NOTE — PLAN OF CARE
Goal Outcome Evaluation:  Plan of Care Reviewed With: patient, son           Outcome Evaluation: Pt seen for OT eval after admission for symptomatic bradycardia now s/p pacemaker placement on 9/8. Pts family present and son able to translate (declining interpretor at this time). Pt able to complete bed mobility with modA and STS with Bonilla x 1 using FWW. Pt ambulated around foot of bed to chair with min/CGA x 1 using FWW and reporting slight dizziness. Pt and family educated on pacemaker precautions and family stating pt would like to return to home with spouse and that family will assist as needed. OT will con't to follow for stated goals and anticipate no f/u OT needs.      Anticipated Discharge Disposition (OT): home with assist

## 2023-09-09 NOTE — PLAN OF CARE
Goal Outcome Evaluation:  Plan of Care Reviewed With: patient, family           Outcome Evaluation: PT EVAL completed. Pt AO x 4 with s/p pacemaker. Pt used family member for translation. Pt transferred supine to sit with min x 1. Pt transferred sit to stand with min x 1 and RW. Pt ambulated 45 ft with RW and min x 1. Pt transferred sit to supine with min x 1. Pt requires skilled therapy due to decreased functional activity tolerance, decreased transfers, decreased ambulation. Recommend DC home with assist from family.

## 2023-09-10 NOTE — OUTREACH NOTE
Prep Survey      Flowsheet Row Responses   [facility] facility patient discharged from? [facility]   Is LACE score < 7 ? No   Eligibility Readm Mgmt   Discharge diagnosis *Symptomatic bradycardia (   Does the patient have one of the following disease processes/diagnoses(primary or secondary)? General Surgery   Does the patient have Home health ordered? No   Is there a DME ordered? No   Prep survey completed? Yes            Joanne DELACRUZ - Registered Nurse

## 2023-09-11 NOTE — CASE MANAGEMENT/SOCIAL WORK
Discharge Planning Assessment  [facility]     Patient Name: Camryn Dorsey  MRN: [id number]  Today's Date: 9/11/2023    Admit Date: 9/7/2023    Plan: Home, son to transport home   Discharge Needs Assessment    No documentation.                  Discharge Plan       Row Name 09/11/23 1448       Plan    Plan Home, son to transport home    Final Discharge Disposition Code 01 - home or self-care    Final Note Home, son to transport home                  Continued Care and Services - Discharged on 9/9/2023 Admission date: 9/7/2023 - Discharge disposition: Home or Self Care   Coordination has not been started for this encounter.       Expected Discharge Date and Time       Expected Discharge Date Expected Discharge Time    Sep 9, 2023            Demographic Summary    No documentation.                  Functional Status    No documentation.                  Psychosocial    No documentation.                  Abuse/Neglect    No documentation.                  Legal    No documentation.                  Substance Abuse    No documentation.                  Patient Forms    No documentation.                     Mayra Leger RN

## 2023-09-13 ENCOUNTER — READMISSION MANAGEMENT (OUTPATIENT)
Dept: CALL CENTER | Facility: HOSPITAL | Age: 73
End: 2023-09-13
Payer: MEDICARE

## 2023-09-13 NOTE — OUTREACH NOTE
General Surgery Week 1 Survey      Flowsheet Row Responses   [facility] facility patient discharged from? [place]   Does the patient have one of the following disease processes/diagnoses(primary or secondary)? General Surgery   Week 1 attempt successful? No   Unsuccessful attempts Attempt 1            Perri FALK - Registered Nurse

## 2023-09-18 ENCOUNTER — READMISSION MANAGEMENT (OUTPATIENT)
Dept: CALL CENTER | Facility: HOSPITAL | Age: 73
End: 2023-09-18
Payer: MEDICARE

## 2023-09-18 NOTE — PROGRESS NOTES
Subjective:     Encounter Date:09/19/2023      Patient ID: Camryn Dorsey is a 73 y.o. female.    Chief Complaint:  Follow-up post pacemaker    HPI:   73 y.o. female with hypertension, diabetes and recent admission with lethargy, found to have Mobitz I with concern for possible high degree AV block. She underwent pacemaker implantation 9/8/2023.  She presents today for follow-up.  Her lethargy and fatigue have improved.  She denies any dyspnea exertion, chest pressure.  She notes that she has had stomach upset with hydralazine.  In the past she has had leg swelling with amlodipine, was previously on losartan but was discontinued due to cancer risk.?  She vaguely remembers being intolerant to lisinopril as well.    The following portions of the patient's history were reviewed and updated as appropriate: allergies, current medications, past family history, past medical history, past social history, past surgical history and problem list.     REVIEW OF SYSTEMS:   All systems reviewed.  Pertinent positives identified in HPI.  All other systems are negative.    Past Medical History:   Diagnosis Date    Chronic back pain 09/08/2023    Chronic headache 09/08/2023    COPD (chronic obstructive pulmonary disease) 09/08/2023    Diabetes mellitus     DM (diabetes mellitus) 09/07/2023    Suspected sleep apnea 09/08/2023    Venous insufficiency of both lower extremities 09/08/2023       History reviewed. No pertinent family history.    Social History     Socioeconomic History    Marital status:    Tobacco Use    Smoking status: Never    Smokeless tobacco: Never   Vaping Use    Vaping Use: Never used   Substance and Sexual Activity    Alcohol use: Never    Drug use: Never    Sexual activity: Defer       No Known Allergies    Past Surgical History:   Procedure Laterality Date    CARDIAC ELECTROPHYSIOLOGY PROCEDURE N/A 9/8/2023    Procedure: Pacemaker DC new--Medtr[employer];  Surgeon: Matty Garcia MD;   Location: [facility] INVASIVE LOCATION;  Service: Cardiovascular;  Laterality: N/A;         ECG 12 Lead    Date/Time: 9/19/2023 11:32 AM  Performed by: Petr Jauregui MD  Authorized by: Petr Jauregui MD   Comparison: compared with previous ECG from 9/8/2023  Similar to previous ECG  Rhythm: sinus rhythm and paced  Rate: normal  Conduction: conduction normal  QRS axis: normal  Pacing: atrial sensed rhythm and ventricular paced rhythm  Clinical impression: abnormal EKG           Objective:         Vitals:    09/19/23 1048   BP: 148/82   Pulse: 99   SpO2: 95%       PHYSICAL EXAM:  GEN: well appearing, in NAD   HEENT: NCAT, EOMI, moist mucus membranes   Respiratory: CTAB, no wheezes, rales or rhonchi  CV: normal rate, regular rhythm, normal S1, S2, no murmurs, rubs, gallops, +2 radial pulses b/l  GI: soft, nontender, nondistended  MSK: no edema  Skin: no rash, warm, dry  Heme/Lymph: no bruising or bleeding  Neuro: Alert and Oriented x 3, grossly normal motor function        Assessment:         (R00.1) Symptomatic bradycardia    (I10) Primary hypertension    73 y.o. female with hypertension, diabetes and recent admission with lethargy, found to have Mobitz I with concern for possible high degree AV block.       Plan:       #HTN  140/82.  Hydralazine has been upsetting her stomach.  -Discontinue hydralazine  -Nifedipine 60 mg daily    #Symptomatic bradycardia status post pacemaker  Pacemaker functioning well.  She RV paces 88.8% of the time.    I will see the patient again in the office in 1 month or earlier as needed.         Petr Jauregui MD  09/19/23  [facility]    Outpatient Encounter Medications as of 9/19/2023   Medication Sig Dispense Refill    albuterol sulfate  (90 Base) MCG/ACT inhaler Inhale 2 puffs Every 4 (Four) Hours As Needed for Wheezing. 18 g 0    baclofen (LIORESAL) 10 MG tablet Take 1 tablet by mouth 3 (Three) Times a Day As Needed.      dapagliflozin Propanediol (Farxiga) 10 MG tablet  Take 10 mg by mouth Daily.      diclofenac-miSOPROStol (ARTHROTEC 50) 50-0.2 MG EC tablet Take 1 tablet by mouth 2 (Two) Times a Day.      Dulaglutide (Trulicity) 1.5 MG/0.5ML solution pen-injector Inject 1.5 mg under the skin into the appropriate area as directed 1 (One) Time Per Week.      fluticasone (FLONASE) 50 MCG/ACT nasal spray 2 sprays into the nostril(s) as directed by provider Daily.      Fluticasone-Umeclidin-Vilant (Trelegy Ellipta) 200-62.5-25 MCG/ACT aerosol powder  Inhale 1 puff Daily.      furosemide (LASIX) 40 MG tablet Take 1 tablet by mouth Daily.      gabapentin (NEURONTIN) 300 MG capsule Take 1 capsule by mouth Every Morning.      gabapentin (NEURONTIN) 300 MG capsule Take 2 capsules by mouth Every Night.      guaifenesin-dextromethorphan (MUCINEX DM)  MG tablet sustained-release 12 hour tablet Take 1 tablet by mouth 2 (Two) Times a Day As Needed (cough). 20 tablet 0    loratadine (CLARITIN) 10 MG tablet Take 1 tablet by mouth Daily.      metFORMIN (GLUCOPHAGE) 1000 MG tablet Take 1 tablet by mouth 2 (Two) Times a Day With Meals.      omeprazole (priLOSEC) 20 MG capsule Take 1 capsule by mouth Daily As Needed.      rosuvastatin (CRESTOR) 40 MG tablet Take 1 tablet by mouth Every Night.      topiramate (TOPAMAX) 50 MG tablet Take 1 tablet by mouth 2 (Two) Times a Day.      [DISCONTINUED] hydrALAZINE (APRESOLINE) 50 MG tablet Take 1 tablet by mouth Every 8 (Eight) Hours. 90 tablet 1    NIFEdipine XL (PROCARDIA XL) 60 MG 24 hr tablet Take 1 tablet by mouth Daily. 90 tablet 3     No facility-administered encounter medications on file as of 9/19/2023.

## 2023-09-19 ENCOUNTER — CLINICAL SUPPORT NO REQUIREMENTS (OUTPATIENT)
Dept: CARDIOLOGY | Facility: CLINIC | Age: 73
End: 2023-09-19
Payer: MEDICARE

## 2023-09-19 ENCOUNTER — OFFICE VISIT (OUTPATIENT)
Dept: CARDIOLOGY | Facility: CLINIC | Age: 73
End: 2023-09-19
Payer: MEDICARE

## 2023-09-19 VITALS
WEIGHT: 189 LBS | HEART RATE: 99 BPM | DIASTOLIC BLOOD PRESSURE: 82 MMHG | SYSTOLIC BLOOD PRESSURE: 148 MMHG | HEIGHT: 60 IN | BODY MASS INDEX: 37.11 KG/M2 | OXYGEN SATURATION: 95 %

## 2023-09-19 DIAGNOSIS — R00.1 SYMPTOMATIC BRADYCARDIA: Primary | ICD-10-CM

## 2023-09-19 DIAGNOSIS — I10 PRIMARY HYPERTENSION: ICD-10-CM

## 2023-09-19 DIAGNOSIS — I44.2 HEART BLOCK AV COMPLETE: Primary | ICD-10-CM

## 2023-09-19 PROBLEM — E03.9 HYPOTHYROIDISM: Status: ACTIVE | Noted: 2021-06-15

## 2023-09-19 PROBLEM — F32.A DEPRESSION: Status: ACTIVE | Noted: 2023-09-19

## 2023-09-19 PROBLEM — E78.5 HYPERLIPIDEMIA: Status: ACTIVE | Noted: 2023-09-19

## 2023-09-19 PROBLEM — E11.65 CONTROLLED TYPE 2 DIABETES MELLITUS WITH HYPERGLYCEMIA: Status: ACTIVE | Noted: 2020-01-07

## 2023-09-19 PROBLEM — Z12.11 COLON CANCER SCREENING: Status: ACTIVE | Noted: 2020-06-29

## 2023-09-19 PROBLEM — R51.9 NEW ONSET HEADACHE: Status: ACTIVE | Noted: 2019-02-18

## 2023-09-19 PROBLEM — J45.909 MILD ASTHMA: Status: ACTIVE | Noted: 2020-06-25

## 2023-09-19 PROBLEM — M19.90 ARTHRITIS: Status: ACTIVE | Noted: 2021-06-11

## 2023-09-19 PROBLEM — Z22.322 MRSA (METHICILLIN RESISTANT STAPH AUREUS) CULTURE POSITIVE: Status: ACTIVE | Noted: 2020-03-30

## 2023-09-19 RX ORDER — NIFEDIPINE 60 MG/1
60 TABLET, EXTENDED RELEASE ORAL DAILY
Qty: 90 TABLET | Refills: 3 | Status: SHIPPED | OUTPATIENT
Start: 2023-09-19

## 2023-09-19 NOTE — OUTREACH NOTE
General Surgery Week 2 Survey      Flowsheet Row Responses   [facility], [place], operated by [facility] patient discharged from? [place]   Does the patient have one of the following disease processes/diagnoses(primary or secondary)? General Surgery   Week 2 attempt successful? Yes   Call start time 1958   Call end time 2015   Discharge diagnosis *Symptomatic bradycardia (   If primary language is not English what is the name and relationship or agency of  used? [facility]  #[id number]   Is patient permission given to speak with other caregiver? Yes   List who call center can speak with daughter   Person spoke with today (if not patient) and relationship patient and daughter   Meds reviewed with patient/caregiver? Yes   Is the patient having any side effects they believe may be caused by any medication additions or changes? No   Does the patient have all medications related to this admission filled (includes all antibiotics, pain medications, etc.) Yes   Is the patient taking all medications as directed (includes completed medication regime)? Yes   Does the patient have a follow up appointment scheduled with their surgeon? Yes   Has the patient kept scheduled appointments due by today? N/A   Comments Has appt tomorrow with cardiology   Psychosocial issues? No   Did the patient receive a copy of their discharge instructions? Yes   Nursing interventions Reviewed instructions with patient   What is the patient's perception of their health status since discharge? Improving   Nursing interventions Nurse provided patient education   Is the patient /caregiver able to teach back basic post-op care? Practice 'cough and deep breath'   Is the patient/caregiver able to teach back signs and symptoms of incisional infection? Increased redness, swelling or pain at the incisonal site, Increased drainage or bleeding, Incisional warmth, Pus or odor from incision, Fever   Is the patient/caregiver able to teach back steps to recovery at home?  Rest and rebuild strength, gradually increase activity, Eat a well-balance diet   If the patient is a current smoker, are they able to teach back resources for cessation? Not a smoker   Is the patient/caregiver able to teach back the hierarchy of who to call/visit for symptoms/problems? PCP, Specialist, Home health nurse, Urgent Care, ED, 911 Yes   Additional teach back comments States she is having a persistent cough and will speak with  tomorrow regarding this.  Site is healing well.  She is watching her sodium intake and greasy foods.   Week 2 call completed? Yes   Call end time 2015            Selene CROOKS - Licensed Nurse

## 2023-09-27 ENCOUNTER — READMISSION MANAGEMENT (OUTPATIENT)
Dept: CALL CENTER | Facility: HOSPITAL | Age: 73
End: 2023-09-27
Payer: MEDICARE

## 2023-09-27 NOTE — OUTREACH NOTE
General Surgery Week 3 Survey      Flowsheet Row Responses   [facility] patient discharged from? [place]   Does the patient have one of the following disease processes/diagnoses(primary or secondary)? General Surgery   Week 3 attempt successful? No   Unsuccessful attempts Attempt 1            Latisha COHN - Registered Nurse

## 2023-10-02 ENCOUNTER — READMISSION MANAGEMENT (OUTPATIENT)
Dept: CALL CENTER | Facility: HOSPITAL | Age: 73
End: 2023-10-02
Payer: MEDICARE

## 2023-10-02 NOTE — OUTREACH NOTE
General Surgery Week 3 Survey      Flowsheet Row Responses   [facility] patient discharged from? [place]   Does the patient have one of the following disease processes/diagnoses(primary or secondary)? General Surgery   Week 3 attempt successful? No   Unsuccessful attempts Attempt 2  [[facility]  #[id number]]            Selene CROOKS - Licensed Nurse

## 2023-10-13 ENCOUNTER — TELEPHONE (OUTPATIENT)
Age: 73
End: 2023-10-13
Payer: MEDICARE

## 2023-10-13 RX ORDER — HYDRALAZINE HYDROCHLORIDE 50 MG/1
50 TABLET, FILM COATED ORAL 3 TIMES DAILY
Qty: 90 TABLET | Refills: 11 | Status: SHIPPED | OUTPATIENT
Start: 2023-10-13

## 2023-10-13 NOTE — TELEPHONE ENCOUNTER
Patients care giver are requesting to restart prescription hydralazine 50mg send to [facility].   He can be reached at [phone] [place]

## 2023-10-24 ENCOUNTER — CLINICAL SUPPORT NO REQUIREMENTS (OUTPATIENT)
Age: 73
End: 2023-10-24
Payer: MEDICARE

## 2023-10-24 ENCOUNTER — OFFICE VISIT (OUTPATIENT)
Age: 73
End: 2023-10-24
Payer: MEDICARE

## 2023-10-24 VITALS
BODY MASS INDEX: 36.71 KG/M2 | SYSTOLIC BLOOD PRESSURE: 142 MMHG | HEART RATE: 85 BPM | HEIGHT: 60 IN | DIASTOLIC BLOOD PRESSURE: 78 MMHG | WEIGHT: 187 LBS

## 2023-10-24 DIAGNOSIS — I44.2 AV BLOCK, COMPLETE: Primary | ICD-10-CM

## 2023-10-24 DIAGNOSIS — I10 PRIMARY HYPERTENSION: ICD-10-CM

## 2023-10-24 DIAGNOSIS — R00.1 SYMPTOMATIC BRADYCARDIA: ICD-10-CM

## 2023-10-24 DIAGNOSIS — I44.2 HEART BLOCK AV COMPLETE: Primary | ICD-10-CM

## 2023-10-24 PROCEDURE — 3077F SYST BP >= 140 MM HG: CPT

## 2023-10-24 PROCEDURE — 93280 PM DEVICE PROGR EVAL DUAL: CPT | Performed by: INTERNAL MEDICINE

## 2023-10-24 PROCEDURE — 1159F MED LIST DOCD IN RCRD: CPT

## 2023-10-24 PROCEDURE — 1160F RVW MEDS BY RX/DR IN RCRD: CPT

## 2023-10-24 PROCEDURE — 99214 OFFICE O/P EST MOD 30 MIN: CPT

## 2023-10-24 PROCEDURE — 3078F DIAST BP <80 MM HG: CPT

## 2023-10-24 RX ORDER — HYDRALAZINE HYDROCHLORIDE 50 MG/1
50 TABLET, FILM COATED ORAL 3 TIMES DAILY
Qty: 270 TABLET | Refills: 1 | Status: SHIPPED | OUTPATIENT
Start: 2023-10-24

## 2023-10-24 NOTE — PROGRESS NOTES
Date of Office Visit: 10/24/2023  Encounter Provider: SRIRAM Mcdowell  Place of Service: [facility] CARDIOLOGY  Patient Name: Camryn Dorsey  :1950    Chief Complaint   Patient presents with    Fatigue    Slow Heart Rate           2nd degree heart block    Pacemaker Check   :     HPI: Camryn Dorsey is a 73 y.o. female who follows with Dr. Jauregui and Dr. Garcia. She has AV block---intermittent high degree and Wenckebach--s/p DC PPM (2023).     She presented to ED last month with complaints of dizziness and fatigue. Was in high degree AV block and periods of Wenckebach.     Dr. Garcia saw recommended pacemaker. She had DC LB pacemaker placed on 2023.                      She presents today for follow up appt.    Since pacemaker placement she says she has been doing better.     She has not had any further episodes of dizziness or extreme fatigue.     No complaints or concerns about her device.     Incision is well healed. No signs of infection or hematoma.     Normal device testing and function in office today. RV:100%. No events on device.     She has high BP. Tried to switch to nifedipine but she is now back on hydralazine. Dr. Jauregui is managing.     Past Medical History:   Diagnosis Date    Chronic back pain 2023    Chronic headache 2023    COPD (chronic obstructive pulmonary disease) 2023    Diabetes mellitus     DM (diabetes mellitus) 2023    Suspected sleep apnea 2023    Venous insufficiency of both lower extremities 2023       Past Surgical History:   Procedure Laterality Date    CARDIAC ELECTROPHYSIOLOGY PROCEDURE N/A 2023    Procedure: Pacemaker DC new--Medtronic;  Surgeon: Matty Garcia MD;  Location: [facility] INVASIVE LOCATION;  Service: Cardiovascular;  Laterality: N/A;       Social History     Socioeconomic History    Marital status:    Tobacco Use    Smoking status: Never    Smokeless  tobacco: Never   Vaping Use    Vaping Use: Never used   Substance and Sexual Activity    Alcohol use: Never    Drug use: Never    Sexual activity: Defer       History reviewed. No pertinent family history.    ROS    No Known Allergies      Current Outpatient Medications:     hydrALAZINE (APRESOLINE) 50 MG tablet, Take 1 tablet by mouth 3 (Three) Times a Day., Disp: 270 tablet, Rfl: 1    albuterol sulfate  (90 Base) MCG/ACT inhaler, Inhale 2 puffs Every 4 (Four) Hours As Needed for Wheezing., Disp: 18 g, Rfl: 0    baclofen (LIORESAL) 10 MG tablet, Take 1 tablet by mouth 3 (Three) Times a Day As Needed., Disp: , Rfl:     dapagliflozin Propanediol (Farxiga) 10 MG tablet, Take 10 mg by mouth Daily., Disp: , Rfl:     diclofenac-miSOPROStol (ARTHROTEC 50) 50-0.2 MG EC tablet, Take 1 tablet by mouth 2 (Two) Times a Day., Disp: , Rfl:     Dulaglutide (Trulicity) 1.5 MG/0.5ML solution pen-injector, Inject 1.5 mg under the skin into the appropriate area as directed 1 (One) Time Per Week., Disp: , Rfl:     fluticasone (FLONASE) 50 MCG/ACT nasal spray, 2 sprays into the nostril(s) as directed by provider Daily., Disp: , Rfl:     Fluticasone-Umeclidin-Vilant (Trelegy Ellipta) 200-62.5-25 MCG/ACT aerosol powder , Inhale 1 puff Daily., Disp: , Rfl:     furosemide (LASIX) 40 MG tablet, Take 1 tablet by mouth Daily., Disp: , Rfl:     gabapentin (NEURONTIN) 300 MG capsule, Take 1 capsule by mouth Every Morning., Disp: , Rfl:     gabapentin (NEURONTIN) 300 MG capsule, Take 2 capsules by mouth Every Night., Disp: , Rfl:     guaifenesin-dextromethorphan (MUCINEX DM)  MG tablet sustained-release 12 hour tablet, Take 1 tablet by mouth 2 (Two) Times a Day As Needed (cough)., Disp: 20 tablet, Rfl: 0    loratadine (CLARITIN) 10 MG tablet, Take 1 tablet by mouth Daily., Disp: , Rfl:     metFORMIN (GLUCOPHAGE) 1000 MG tablet, Take 1 tablet by mouth 2 (Two) Times a Day With Meals., Disp: , Rfl:     omeprazole (priLOSEC) 20 MG  "capsule, Take 1 capsule by mouth Daily As Needed., Disp: , Rfl:     rosuvastatin (CRESTOR) 40 MG tablet, Take 1 tablet by mouth Every Night., Disp: , Rfl:     topiramate (TOPAMAX) 50 MG tablet, Take 1 tablet by mouth 2 (Two) Times a Day., Disp: , Rfl:       Objective:     Vitals:    10/24/23 1325   BP: 142/78   Pulse: 85   Weight: 84.8 kg (187 lb)   Height: 152.4 cm (60\")     Body mass index is 36.52 kg/m².    PHYSICAL EXAM:    Vitals Reviewed.   General Appearance: No acute distress, well developed and well nourished.   Eyes: Conjunctiva and lids: No erythema, swelling, or discharge. Sclera non-icteric.   HENT: Atraumatic, normocephalic. External eyes, ears, and nose normal.   Respiratory: No signs of respiratory distress. Respiration rhythm and depth normal.   Clear to auscultation. No rales, crackles, rhonchi, or wheezing auscultated.   Cardiovascular:  Heart Rate and Rhythm: Normal, Heart Sounds: Normal S1 and S2. No S3 or S4 noted.  Gastrointestinal:  Abdomen soft, non-distended, non-tender.   Musculoskeletal: Normal movement of extremities  Skin: Warm and dry.   Psychiatric: Patient alert and oriented to person, place, and time. Speech and behavior appropriate. Normal mood and affect.     Procedures      Assessment:       Diagnosis Plan   1. AV block, complete        2. Symptomatic bradycardia        3. Primary hypertension  hydrALAZINE (APRESOLINE) 50 MG tablet             Plan:   1-2. Complete HB, bradycardia---s/p DC PPM (9/2023)---- she feels much better since device placement. Her symptoms have improved. Normal device testing and function in office today. : 100%. No events. Incision is well healed.     3. HTN--- her BP is well controlled. Dr. Jauregui tried to switch from hydralazine to nifedipine but she preferred hydralazine so now back on that.           Follow up in 3 months with device check then yearly/       As always, it has been a pleasure to participate in your patient's care.      Sincerely, "         Paul Denny, APRN

## 2024-04-16 ENCOUNTER — CLINICAL SUPPORT NO REQUIREMENTS (OUTPATIENT)
Age: 74
End: 2024-04-16
Payer: MEDICARE

## 2024-04-16 ENCOUNTER — OFFICE VISIT (OUTPATIENT)
Age: 74
End: 2024-04-16
Payer: MEDICARE

## 2024-04-16 VITALS
SYSTOLIC BLOOD PRESSURE: 134 MMHG | BODY MASS INDEX: 37.11 KG/M2 | WEIGHT: 189 LBS | DIASTOLIC BLOOD PRESSURE: 82 MMHG | HEART RATE: 88 BPM | HEIGHT: 60 IN

## 2024-04-16 DIAGNOSIS — I44.2 AV BLOCK, COMPLETE: Primary | ICD-10-CM

## 2024-04-16 DIAGNOSIS — I44.2 HEART BLOCK AV COMPLETE: Primary | ICD-10-CM

## 2024-04-16 RX ORDER — GLIMEPIRIDE 2 MG/1
2 TABLET ORAL DAILY
COMMUNITY
Start: 2024-03-15

## 2024-04-16 RX ORDER — LEVOTHYROXINE SODIUM 0.03 MG/1
1 TABLET ORAL DAILY
COMMUNITY
Start: 2024-03-13

## 2024-04-16 RX ORDER — TRAZODONE HYDROCHLORIDE 50 MG/1
50 TABLET ORAL DAILY
COMMUNITY
Start: 2024-03-15

## 2024-04-16 RX ORDER — PANTOPRAZOLE SODIUM 40 MG/1
40 TABLET, DELAYED RELEASE ORAL DAILY
COMMUNITY
Start: 2023-12-19

## 2024-04-16 RX ORDER — CETIRIZINE HYDROCHLORIDE 10 MG/1
10 TABLET ORAL DAILY
COMMUNITY
Start: 2024-02-20

## 2024-04-16 RX ORDER — DULOXETIN HYDROCHLORIDE 30 MG/1
30 CAPSULE, DELAYED RELEASE ORAL DAILY
COMMUNITY
Start: 2024-03-13

## 2024-11-21 ENCOUNTER — TELEPHONE (OUTPATIENT)
Age: 74
End: 2024-11-21
Payer: MEDICARE

## 2025-02-11 ENCOUNTER — TELEPHONE (OUTPATIENT)
Age: 75
End: 2025-02-11
Payer: MEDICARE

## 2025-02-11 NOTE — TELEPHONE ENCOUNTER
Received a refill request for Nifedipine ER 60mg from the pharmacy. I was unable to pend due to being discontinue by Priyanka Miller.   Please advise.   Lov on: 9- IP  Nov on: 4- ANITA

## 2025-02-11 NOTE — TELEPHONE ENCOUNTER
It looks like he discontinued it because she was no longer on it. I haven't seen her in over a year, she needs a visit or she can follow with her PCP for HTN.

## 2025-04-15 ENCOUNTER — TRANSCRIBE ORDERS (OUTPATIENT)
Dept: ADMINISTRATIVE | Facility: HOSPITAL | Age: 75
End: 2025-04-15
Payer: MEDICARE

## 2025-04-15 DIAGNOSIS — G47.33 OBSTRUCTIVE SLEEP APNEA: ICD-10-CM

## 2025-04-15 DIAGNOSIS — I10 PRIMARY HYPERTENSION: Primary | ICD-10-CM

## 2025-04-15 DIAGNOSIS — R06.01 ORTHOPNEA: ICD-10-CM

## 2025-05-01 ENCOUNTER — CLINICAL SUPPORT NO REQUIREMENTS (OUTPATIENT)
Age: 75
End: 2025-05-01
Payer: MEDICARE

## 2025-05-01 ENCOUNTER — OFFICE VISIT (OUTPATIENT)
Age: 75
End: 2025-05-01
Payer: MEDICARE

## 2025-05-01 VITALS
HEART RATE: 74 BPM | WEIGHT: 192 LBS | HEIGHT: 60 IN | BODY MASS INDEX: 37.69 KG/M2 | DIASTOLIC BLOOD PRESSURE: 82 MMHG | SYSTOLIC BLOOD PRESSURE: 132 MMHG

## 2025-05-01 DIAGNOSIS — I44.2 AV BLOCK, COMPLETE: Primary | ICD-10-CM

## 2025-05-01 DIAGNOSIS — I44.2 HEART BLOCK AV COMPLETE: Primary | ICD-10-CM

## 2025-05-01 DIAGNOSIS — R00.1 SYMPTOMATIC BRADYCARDIA: ICD-10-CM

## 2025-05-01 PROCEDURE — 1160F RVW MEDS BY RX/DR IN RCRD: CPT

## 2025-05-01 PROCEDURE — 1159F MED LIST DOCD IN RCRD: CPT

## 2025-05-01 PROCEDURE — 3075F SYST BP GE 130 - 139MM HG: CPT

## 2025-05-01 PROCEDURE — 93000 ELECTROCARDIOGRAM COMPLETE: CPT

## 2025-05-01 PROCEDURE — 3079F DIAST BP 80-89 MM HG: CPT

## 2025-05-01 PROCEDURE — 99213 OFFICE O/P EST LOW 20 MIN: CPT

## 2025-05-01 RX ORDER — GLIMEPIRIDE 4 MG/1
4 TABLET ORAL DAILY
COMMUNITY
Start: 2025-04-04

## 2025-05-01 RX ORDER — FLUTICASONE FUROATE, UMECLIDINIUM BROMIDE AND VILANTEROL TRIFENATATE 200; 62.5; 25 UG/1; UG/1; UG/1
1 POWDER RESPIRATORY (INHALATION) DAILY
COMMUNITY
Start: 2025-04-30

## 2025-05-01 RX ORDER — LOSARTAN POTASSIUM 50 MG/1
50 TABLET ORAL DAILY
COMMUNITY
Start: 2025-04-04

## 2025-05-01 RX ORDER — FUROSEMIDE 40 MG/1
40 TABLET ORAL 2 TIMES DAILY
COMMUNITY
Start: 2025-04-04

## 2025-05-01 RX ORDER — MECLIZINE HYDROCHLORIDE 25 MG/1
25 TABLET ORAL 3 TIMES DAILY PRN
COMMUNITY
Start: 2025-04-04

## 2025-05-01 RX ORDER — MONTELUKAST SODIUM 10 MG/1
10 TABLET ORAL DAILY
COMMUNITY
Start: 2025-04-30 | End: 2026-04-30

## 2025-05-01 RX ORDER — NYSTATIN AND TRIAMCINOLONE ACETONIDE 100000; 1 [USP'U]/G; MG/G
CREAM TOPICAL 4 TIMES DAILY
COMMUNITY
Start: 2025-03-25

## 2025-05-01 RX ORDER — SUCRALFATE 1 G/1
1 TABLET ORAL 4 TIMES DAILY
COMMUNITY
Start: 2025-04-04

## 2025-05-01 RX ORDER — TIZANIDINE 2 MG/1
2 TABLET ORAL
COMMUNITY
Start: 2025-04-04

## 2025-05-01 RX ORDER — NYSTATIN 100000 [USP'U]/G
POWDER TOPICAL 4 TIMES DAILY
COMMUNITY
Start: 2025-03-25

## 2025-05-01 RX ORDER — DEXTROMETHORPHAN HBR. AND GUAIFENESIN 10; 100 MG/5ML; MG/5ML
5 SOLUTION ORAL EVERY 12 HOURS
COMMUNITY
Start: 2025-04-04

## 2025-05-01 RX ORDER — PRAZOSIN HYDROCHLORIDE 1 MG/1
1 CAPSULE ORAL DAILY
COMMUNITY
Start: 2025-04-04

## 2025-05-01 NOTE — PROGRESS NOTES
Date of Office Visit: 2025  Encounter Provider: SRIRAM Mcdowell  Place of Service: [facility] CARDIOLOGY  Patient Name: Camryn Dorsey  :1950    Chief Complaint   Patient presents with    AV block, complete    symptomatic bradycardia    Pacemaker Check   :     HPI: Camryn Dorsey is a 74 y.o. female who with Dr. Guaman, referred to Dr. Garcia.  She has symptomatic Mobitz 1 block status post dual-chamber pacemaker placement.    He presented with symptomatic AV block and underwent dual-chamber pacemaker placement Dr. Garcia in .    She has done well since pacemaker placement.  She has not had any recurrent symptoms of bradycardia.          She presents today for follow-up appointment.    Since last visit she has been doing well.    She has some occasional arthritic pains.    From a cardiac standpoint she is doing well.  She reports no symptoms.    Normal device testing function office today.  AP: 30%, RV: 99%..    She is intermittently dependent.            Past Medical History:   Diagnosis Date    Chronic back pain 2023    Chronic headache 2023    COPD (chronic obstructive pulmonary disease) 2023    Diabetes mellitus     DM (diabetes mellitus) 2023    Suspected sleep apnea 2023    Venous insufficiency of both lower extremities 2023       Past Surgical History:   Procedure Laterality Date    CARDIAC ELECTROPHYSIOLOGY PROCEDURE N/A 2023    Procedure: Pacemaker DC new--Medtronic;  Surgeon: Matty Garcia MD;  Location: [facility] INVAS[facility] LOC[facility];  Service: Cardiovascular;  Laterality: N/A;       Social History     Socioeconomic History    Marital status:    Tobacco Use    Smoking status: Never     Passive exposure: Never    Smokeless tobacco: Never   Vaping Use    Vaping status: Never Used   Substance and Sexual Activity    Alcohol use: Never    Drug use: Never    Sexual activity: Defer        History reviewed. No pertinent family history.    Review of Systems   Constitutional: Negative for chills, fever and malaise/fatigue.   Cardiovascular:  Negative for chest pain, dyspnea on exertion, leg swelling, near-syncope, orthopnea, palpitations, paroxysmal nocturnal dyspnea and syncope.   Respiratory:  Negative for cough and shortness of breath.    Hematologic/Lymphatic: Negative.    Musculoskeletal:  Negative for joint pain, joint swelling and myalgias.   Gastrointestinal:  Negative for abdominal pain, diarrhea, melena, nausea and vomiting.   Genitourinary:  Negative for frequency and hematuria.   Neurological:  Negative for light-headedness, numbness, paresthesias and seizures.   Allergic/Immunologic: Negative.    All other systems reviewed and are negative.      No Known Allergies      Current Outpatient Medications:     albuterol sulfate  (90 Base) MCG/ACT inhaler, Inhale 2 puffs Every 4 (Four) Hours As Needed for Wheezing., Disp: 18 g, Rfl: 0    cetirizine (zyrTEC) 10 MG tablet, Take 1 tablet by mouth Daily., Disp: , Rfl:     dapagliflozin Propanediol (Farxiga) 10 MG tablet, Take 10 mg by mouth Daily., Disp: , Rfl:     dextromethorphan-guaifenesin (ROBITUSSIN-DM)  MG/5ML liquid, Take 5 mL by mouth Every 12 (Twelve) Hours., Disp: , Rfl:     Diclofenac Sodium (VOLTAREN) 1 % gel gel, Apply 4 g topically to the appropriate area as directed., Disp: , Rfl:     fluticasone (FLONASE) 50 MCG/ACT nasal spray, Administer 2 sprays into the nostril(s) as directed by provider Daily., Disp: , Rfl:     furosemide (LASIX) 40 MG tablet, Take 1 tablet by mouth 2 (Two) Times a Day., Disp: , Rfl:     glimepiride (AMARYL) 4 MG tablet, Take 1 tablet by mouth Daily., Disp: , Rfl:     hydrALAZINE (APRESOLINE) 50 MG tablet, Take 1 tablet by mouth 3 (Three) Times a Day., Disp: 270 tablet, Rfl: 1    levothyroxine (SYNTHROID, LEVOTHROID) 25 MCG tablet, Take 1 tablet by mouth Daily., Disp: , Rfl:     losartan  "(COZAAR) 50 MG tablet, Take 1 tablet by mouth Daily., Disp: , Rfl:     meclizine (ANTIVERT) 25 MG tablet, Take 1 tablet by mouth 3 (Three) Times a Day As Needed., Disp: , Rfl:     montelukast (SINGULAIR) 10 MG tablet, Take 1 tablet by mouth Daily., Disp: , Rfl:     nystatin (MYCOSTATIN) 678074 UNIT/GM powder, Apply  topically to the appropriate area as directed 4 (Four) Times a Day., Disp: , Rfl:     nystatin-triamcinolone (MYCOLOG II) 296471-1.1 UNIT/GM-% cream, Apply  topically to the appropriate area as directed 4 (Four) Times a Day., Disp: , Rfl:     pantoprazole (PROTONIX) 40 MG EC tablet, Take 1 tablet by mouth Daily., Disp: , Rfl:     prazosin (MINIPRESS) 1 MG capsule, Take 1 capsule by mouth Daily., Disp: , Rfl:     rosuvastatin (CRESTOR) 40 MG tablet, Take 1 tablet by mouth Every Night., Disp: , Rfl:     sucralfate (CARAFATE) 1 g tablet, Take 1 tablet by mouth 4 (Four) Times a Day., Disp: , Rfl:     tiZANidine (ZANAFLEX) 2 MG tablet, Take 1 tablet by mouth., Disp: , Rfl:     topiramate (TOPAMAX) 50 MG tablet, Take 1 tablet by mouth 2 (Two) Times a Day., Disp: , Rfl:     traZODone (DESYREL) 50 MG tablet, Take 1 tablet by mouth Daily., Disp: , Rfl:     Trelegy Ellipta 200-62.5-25 MCG/ACT inhaler, Inhale 1 puff Daily., Disp: , Rfl:       Objective:     Vitals:    05/01/25 0920   BP: 132/82   BP Location: Right arm   Patient Position: Sitting   Pulse: 74   Weight: 87.1 kg (192 lb)   Height: 152.4 cm (60\")     Body mass index is 37.5 kg/m².    PHYSICAL EXAM:    Vitals Reviewed.   General Appearance: No acute distress, well developed and well nourished.   Respiratory: No signs of respiratory distress. Respiration rhythm and depth normal.   Cardiovascular:  Heart Rate and Rhythm: Normal  Skin: Warm and dry.   Psychiatric: Patient alert and oriented to person, place, and time. Speech and behavior appropriate. Normal mood and affect.       ECG 12 Lead    Date/Time: 5/1/2025 11:25 AM  Performed by: Paul Denny, " SRIRAM    Authorized by: Paul Denny APRN  Comparison: compared with previous ECG   Similar to previous ECG  Rhythm: sinus rhythm and paced            Assessment:       Diagnosis Plan   1. AV block, complete        2. Symptomatic bradycardia               Plan:   1-2.  AV block, symptomatic bradycardia----she had a pacemaker placed for symptomatic AV block in 2023.  She has done very well since then without any recurrent symptoms.  Normal device testing function office today.  AP: 30%, : 99%.  She is intermittently dependent.        Follow-up in 1 year with device check.            I spent at least 30 minutes reviewing previous notes, labs, EKGs, device reports and/or time with the patient.         As always, it has been a pleasure to participate in your patient's care.      Sincerely,         SRIRAM Mcdowell

## 2025-06-24 LAB
MC_CV_MDC_IDC_RATE_1: 150
MC_CV_MDC_IDC_RATE_1: 171
MC_CV_MDC_IDC_ZONE_ID: 2
MC_CV_MDC_IDC_ZONE_ID: 6
MDC_IDC_MSMT_BATTERY_REMAINING_LONGEVITY: 130 MO
MDC_IDC_MSMT_BATTERY_RRT_TRIGGER: 2.62
MDC_IDC_MSMT_BATTERY_STATUS: NORMAL
MDC_IDC_MSMT_BATTERY_VOLTAGE: 3.03
MDC_IDC_MSMT_LEADCHNL_RA_DTM: NORMAL
MDC_IDC_MSMT_LEADCHNL_RA_IMPEDANCE_VALUE: 342
MDC_IDC_MSMT_LEADCHNL_RA_PACING_THRESHOLD_AMPLITUDE: 0.5
MDC_IDC_MSMT_LEADCHNL_RA_PACING_THRESHOLD_POLARITY: NORMAL
MDC_IDC_MSMT_LEADCHNL_RA_PACING_THRESHOLD_PULSEWIDTH: 0.4
MDC_IDC_MSMT_LEADCHNL_RA_SENSING_INTR_AMPL: 1.88
MDC_IDC_MSMT_LEADCHNL_RV_DTM: NORMAL
MDC_IDC_MSMT_LEADCHNL_RV_IMPEDANCE_VALUE: 551
MDC_IDC_MSMT_LEADCHNL_RV_PACING_THRESHOLD_AMPLITUDE: 0.75
MDC_IDC_MSMT_LEADCHNL_RV_PACING_THRESHOLD_POLARITY: NORMAL
MDC_IDC_MSMT_LEADCHNL_RV_PACING_THRESHOLD_PULSEWIDTH: 0.4
MDC_IDC_MSMT_LEADCHNL_RV_SENSING_INTR_AMPL: 19
MDC_IDC_PG_IMPLANT_DTM: NORMAL
MDC_IDC_PG_MFG: NORMAL
MDC_IDC_PG_MODEL: NORMAL
MDC_IDC_PG_SERIAL: NORMAL
MDC_IDC_PG_TYPE: NORMAL
MDC_IDC_SESS_DTM: NORMAL
MDC_IDC_SESS_TYPE: NORMAL
MDC_IDC_SET_BRADY_AT_MODE_SWITCH_RATE: 171
MDC_IDC_SET_BRADY_LOWRATE: 60
MDC_IDC_SET_BRADY_MAX_SENSOR_RATE: 130
MDC_IDC_SET_BRADY_MAX_TRACKING_RATE: 130
MDC_IDC_SET_BRADY_MODE: NORMAL
MDC_IDC_SET_BRADY_PAV_DELAY: 180
MDC_IDC_SET_BRADY_SAV_DELAY: 150
MDC_IDC_SET_LEADCHNL_RA_PACING_AMPLITUDE: 1.5
MDC_IDC_SET_LEADCHNL_RA_PACING_POLARITY: NORMAL
MDC_IDC_SET_LEADCHNL_RA_PACING_PULSEWIDTH: 0.4
MDC_IDC_SET_LEADCHNL_RA_SENSING_POLARITY: NORMAL
MDC_IDC_SET_LEADCHNL_RA_SENSING_SENSITIVITY: 0.3
MDC_IDC_SET_LEADCHNL_RV_PACING_AMPLITUDE: 2
MDC_IDC_SET_LEADCHNL_RV_PACING_POLARITY: NORMAL
MDC_IDC_SET_LEADCHNL_RV_PACING_PULSEWIDTH: 0.4
MDC_IDC_SET_LEADCHNL_RV_SENSING_POLARITY: NORMAL
MDC_IDC_SET_LEADCHNL_RV_SENSING_SENSITIVITY: 0.9
MDC_IDC_SET_ZONE_STATUS: NORMAL
MDC_IDC_SET_ZONE_STATUS: NORMAL
MDC_IDC_SET_ZONE_TYPE: NORMAL
MDC_IDC_SET_ZONE_TYPE: NORMAL
MDC_IDC_STAT_AT_BURDEN_PERCENT: 0
MDC_IDC_STAT_BRADY_RA_PERCENT_PACED: 30.4
MDC_IDC_STAT_BRADY_RV_PERCENT_PACED: 99.98

## 2025-07-18 ENCOUNTER — HOSPITAL ENCOUNTER (EMERGENCY)
Facility: HOSPITAL | Age: 75
Discharge: HOME | End: 2025-07-19
Attending: STUDENT IN AN ORGANIZED HEALTH CARE EDUCATION/TRAINING PROGRAM
Payer: MEDICARE

## 2025-07-18 ENCOUNTER — APPOINTMENT (OUTPATIENT)
Dept: GENERAL RADIOLOGY | Facility: HOSPITAL | Age: 75
End: 2025-07-18
Payer: MEDICARE

## 2025-07-18 DIAGNOSIS — R73.9 HYPERGLYCEMIA: ICD-10-CM

## 2025-07-18 DIAGNOSIS — R42 DIZZINESS: Primary | ICD-10-CM

## 2025-07-18 LAB
ALBUMIN SERPL-MCNC: 4.3 G/DL (ref 3.5–5.2)
ALBUMIN/GLOB SERPL: 1.3 G/DL
ALP SERPL-CCNC: 131 U/L (ref 39–117)
ALT SERPL W P-5'-P-CCNC: 22 U/L (ref 1–33)
ANION GAP SERPL CALCULATED.3IONS-SCNC: 11 MMOL/L (ref 5–15)
AST SERPL-CCNC: 17 U/L (ref 1–32)
BASOPHILS # BLD AUTO: 0.03 10*3/MM3 (ref 0–0.2)
BASOPHILS NFR BLD AUTO: 0.4 % (ref 0–1.5)
BILIRUB SERPL-MCNC: 0.3 MG/DL (ref 0–1.2)
BUN SERPL-MCNC: 25 MG/DL (ref 8–23)
BUN/CREAT SERPL: 23.8 (ref 7–25)
CALCIUM SPEC-SCNC: 9.3 MG/DL (ref 8.6–10.5)
CHLORIDE SERPL-SCNC: 95 MMOL/L (ref 98–107)
CO2 SERPL-SCNC: 28 MMOL/L (ref 22–29)
CREAT SERPL-MCNC: 1.05 MG/DL (ref 0.57–1)
DEPRECATED RDW RBC AUTO: 46.7 FL (ref 37–54)
EGFRCR SERPLBLD CKD-EPI 2021: 54.8 ML/MIN/1.73
EOSINOPHIL # BLD AUTO: 0.18 10*3/MM3 (ref 0–0.4)
EOSINOPHIL NFR BLD AUTO: 2.3 % (ref 0.3–6.2)
ERYTHROCYTE [DISTWIDTH] IN BLOOD BY AUTOMATED COUNT: 14.4 % (ref 12.3–15.4)
GEN 5 1HR TROPONIN T REFLEX: 12 NG/L
GLOBULIN UR ELPH-MCNC: 3.3 GM/DL
GLUCOSE BLDC GLUCOMTR-MCNC: 215 MG/DL (ref 70–130)
GLUCOSE BLDC GLUCOMTR-MCNC: 295 MG/DL (ref 70–130)
GLUCOSE SERPL-MCNC: 316 MG/DL (ref 65–99)
HCT VFR BLD AUTO: 44.8 % (ref 34–46.6)
HGB BLD-MCNC: 14.2 G/DL (ref 12–15.9)
HOLD SPECIMEN: NORMAL
HOLD SPECIMEN: NORMAL
IMM GRANULOCYTES # BLD AUTO: 0.03 10*3/MM3 (ref 0–0.05)
IMM GRANULOCYTES NFR BLD AUTO: 0.4 % (ref 0–0.5)
LYMPHOCYTES # BLD AUTO: 2.39 10*3/MM3 (ref 0.7–3.1)
LYMPHOCYTES NFR BLD AUTO: 30.1 % (ref 19.6–45.3)
MAGNESIUM SERPL-MCNC: 2.1 MG/DL (ref 1.6–2.4)
MCH RBC QN AUTO: 28.7 PG (ref 26.6–33)
MCHC RBC AUTO-ENTMCNC: 31.7 G/DL (ref 31.5–35.7)
MCV RBC AUTO: 90.7 FL (ref 79–97)
MONOCYTES # BLD AUTO: 0.53 10*3/MM3 (ref 0.1–0.9)
MONOCYTES NFR BLD AUTO: 6.7 % (ref 5–12)
NEUTROPHILS NFR BLD AUTO: 4.78 10*3/MM3 (ref 1.7–7)
NEUTROPHILS NFR BLD AUTO: 60.1 % (ref 42.7–76)
NRBC BLD AUTO-RTO: 0 /100 WBC (ref 0–0.2)
PLATELET # BLD AUTO: 182 10*3/MM3 (ref 140–450)
PMV BLD AUTO: 11.3 FL (ref 6–12)
POTASSIUM SERPL-SCNC: 4 MMOL/L (ref 3.5–5.2)
PROT SERPL-MCNC: 7.6 G/DL (ref 6–8.5)
RBC # BLD AUTO: 4.94 10*6/MM3 (ref 3.77–5.28)
SODIUM SERPL-SCNC: 134 MMOL/L (ref 136–145)
TROPONIN T NUMERIC DELTA: -1 NG/L
TROPONIN T SERPL HS-MCNC: 13 NG/L
WBC NRBC COR # BLD AUTO: 7.94 10*3/MM3 (ref 3.4–10.8)
WHOLE BLOOD HOLD COAG: NORMAL
WHOLE BLOOD HOLD SPECIMEN: NORMAL

## 2025-07-18 PROCEDURE — 80053 COMPREHEN METABOLIC PANEL: CPT | Performed by: STUDENT IN AN ORGANIZED HEALTH CARE EDUCATION/TRAINING PROGRAM

## 2025-07-18 PROCEDURE — 93005 ELECTROCARDIOGRAM TRACING: CPT | Performed by: STUDENT IN AN ORGANIZED HEALTH CARE EDUCATION/TRAINING PROGRAM

## 2025-07-18 PROCEDURE — 25810000003 SODIUM CHLORIDE 0.9 % SOLUTION: Performed by: STUDENT IN AN ORGANIZED HEALTH CARE EDUCATION/TRAINING PROGRAM

## 2025-07-18 PROCEDURE — 82948 REAGENT STRIP/BLOOD GLUCOSE: CPT

## 2025-07-18 PROCEDURE — 71045 X-RAY EXAM CHEST 1 VIEW: CPT

## 2025-07-18 PROCEDURE — 25010000002 ONDANSETRON PER 1 MG: Performed by: STUDENT IN AN ORGANIZED HEALTH CARE EDUCATION/TRAINING PROGRAM

## 2025-07-18 PROCEDURE — 63710000001 INSULIN REGULAR HUMAN PER 5 UNITS: Performed by: STUDENT IN AN ORGANIZED HEALTH CARE EDUCATION/TRAINING PROGRAM

## 2025-07-18 PROCEDURE — 83735 ASSAY OF MAGNESIUM: CPT | Performed by: STUDENT IN AN ORGANIZED HEALTH CARE EDUCATION/TRAINING PROGRAM

## 2025-07-18 PROCEDURE — 96374 THER/PROPH/DIAG INJ IV PUSH: CPT

## 2025-07-18 PROCEDURE — 85025 COMPLETE CBC W/AUTO DIFF WBC: CPT | Performed by: STUDENT IN AN ORGANIZED HEALTH CARE EDUCATION/TRAINING PROGRAM

## 2025-07-18 PROCEDURE — 36415 COLL VENOUS BLD VENIPUNCTURE: CPT

## 2025-07-18 PROCEDURE — 25010000002 HYDRALAZINE PER 20 MG: Performed by: STUDENT IN AN ORGANIZED HEALTH CARE EDUCATION/TRAINING PROGRAM

## 2025-07-18 PROCEDURE — 84484 ASSAY OF TROPONIN QUANT: CPT | Performed by: STUDENT IN AN ORGANIZED HEALTH CARE EDUCATION/TRAINING PROGRAM

## 2025-07-18 PROCEDURE — 96375 TX/PRO/DX INJ NEW DRUG ADDON: CPT

## 2025-07-18 PROCEDURE — 99284 EMERGENCY DEPT VISIT MOD MDM: CPT

## 2025-07-18 RX ORDER — HYDRALAZINE HYDROCHLORIDE 20 MG/ML
20 INJECTION INTRAMUSCULAR; INTRAVENOUS ONCE
Status: COMPLETED | OUTPATIENT
Start: 2025-07-18 | End: 2025-07-18

## 2025-07-18 RX ORDER — ONDANSETRON 2 MG/ML
4 INJECTION INTRAMUSCULAR; INTRAVENOUS ONCE
Status: COMPLETED | OUTPATIENT
Start: 2025-07-18 | End: 2025-07-18

## 2025-07-18 RX ORDER — SODIUM CHLORIDE 0.9 % (FLUSH) 0.9 %
10 SYRINGE (ML) INJECTION AS NEEDED
Status: DISCONTINUED | OUTPATIENT
Start: 2025-07-18 | End: 2025-07-19 | Stop reason: HOSPADM

## 2025-07-18 RX ADMIN — INSULIN HUMAN 5 UNITS: 100 INJECTION, SOLUTION PARENTERAL at 22:09

## 2025-07-18 RX ADMIN — SODIUM CHLORIDE 500 ML: 9 INJECTION, SOLUTION INTRAVENOUS at 22:52

## 2025-07-18 RX ADMIN — ONDANSETRON 4 MG: 2 INJECTION, SOLUTION INTRAMUSCULAR; INTRAVENOUS at 22:56

## 2025-07-18 RX ADMIN — HYDRALAZINE HYDROCHLORIDE 20 MG: 20 INJECTION INTRAMUSCULAR; INTRAVENOUS at 22:07

## 2025-07-19 VITALS
BODY MASS INDEX: 36.25 KG/M2 | HEIGHT: 61 IN | OXYGEN SATURATION: 94 % | WEIGHT: 192.02 LBS | SYSTOLIC BLOOD PRESSURE: 132 MMHG | RESPIRATION RATE: 16 BRPM | TEMPERATURE: 98.8 F | DIASTOLIC BLOOD PRESSURE: 56 MMHG | HEART RATE: 89 BPM

## 2025-07-19 LAB
BILIRUB UR QL STRIP: NEGATIVE
CLARITY UR: CLEAR
COLOR UR: YELLOW
GLUCOSE BLDC GLUCOMTR-MCNC: 248 MG/DL (ref 70–130)
GLUCOSE UR STRIP-MCNC: ABNORMAL MG/DL
HGB UR QL STRIP.AUTO: NEGATIVE
KETONES UR QL STRIP: NEGATIVE
LEUKOCYTE ESTERASE UR QL STRIP.AUTO: NEGATIVE
NITRITE UR QL STRIP: NEGATIVE
PH UR STRIP.AUTO: 5.5 [PH] (ref 5–8)
PROT UR QL STRIP: NEGATIVE
QT INTERVAL: 439 MS
QTC INTERVAL: 479 MS
SP GR UR STRIP: 1.01 (ref 1–1.03)
UROBILINOGEN UR QL STRIP: ABNORMAL

## 2025-07-19 PROCEDURE — 82948 REAGENT STRIP/BLOOD GLUCOSE: CPT

## 2025-07-19 PROCEDURE — 81003 URINALYSIS AUTO W/O SCOPE: CPT | Performed by: STUDENT IN AN ORGANIZED HEALTH CARE EDUCATION/TRAINING PROGRAM

## 2025-07-19 NOTE — ED PROVIDER NOTES
EMERGENCY DEPARTMENT ENCOUNTER  Room Number:  09/09  PCP: Pete Posey MD  Independent Historians: Historian: Patient and Family      HPI:  Chief Complaint: had concerns including Dizziness.     A complete HPI/ROS/PMH/PSH/SH/FH are unobtainable due to: EM_Unobtainable History : None    Chronic or social conditions impacting patient care (Social Determinants of Health): None      Context: Camryn Dorsey is a 75 y.o. female with a medical history of non-insulin-dependent diabetes, hypertension, BMI 36 who presents to the ED c/o acute dizziness, elevated blood sugar, and elevated blood pressure onset 3 hours prior to arrival.  Patient states the dizziness is mild, like the room is swinging.  She denies any chest pain, difficulty breathing, abdominal pain, or urinary symptoms.  She states this is not happened in the past with elevated blood pressure or elevated glucose.  Patient and family are worried about her elevated blood pressure due to having a pacemaker.  No other complaints at this time.    Patient has no dizziness at this time      Review of prior external notes (non-ED) -and- Review of prior external test results outside of this encounter: I reviewed from this and progress note from 3 months ago.  Type 2 diabetes not on insulin.  Continue Farxiga.  Hypertension, pacemaker, orthopnea and sleep apnea.  Possible CHF and untreated sleep apnea.  Cannot tolerate CPAP.    I reviewed cardiology progress note from 2 months ago.  Symptomatic AV block and underwent dual-chamber pacemaker placement 2023.  She has done well since.  Doing well from a cardiac standpoint.      Prescription drug monitoring program review:         PAST MEDICAL HISTORY  Active Ambulatory Problems     Diagnosis Date Noted    HTN (hypertension) 09/07/2023    Type 2 diabetes mellitus with hyperglycemia, without long-term current use of insulin 09/07/2023    Dizziness 09/07/2023    Symptomatic bradycardia 09/08/2023    Venous  insufficiency of both lower extremities 09/08/2023    COPD (chronic obstructive pulmonary disease) 09/08/2023    Chronic back pain 09/08/2023    Chronic headache 09/08/2023    Suspected sleep apnea 09/08/2023    Arthritis 06/11/2021    Colon cancer screening 06/29/2020    Controlled type 2 diabetes mellitus with hyperglycemia 01/07/2020    Depression 09/19/2023    Distal radius fracture, right 12/24/2015    Hyperlipidemia 09/19/2023    Hypothyroidism 06/15/2021    Mild asthma 06/25/2020    MRSA (methicillin resistant staph aureus) culture positive 03/30/2020    New onset headache 02/18/2019    AV block, complete 10/24/2023     Resolved Ambulatory Problems     Diagnosis Date Noted    Second degree heart block 09/07/2023    Left sided abdominal pain 09/08/2023     Past Medical History:   Diagnosis Date    Diabetes mellitus     DM (diabetes mellitus) 09/07/2023         PAST SURGICAL HISTORY  Past Surgical History:   Procedure Laterality Date    CARDIAC ELECTROPHYSIOLOGY PROCEDURE N/A 9/8/2023    Procedure: Pacemaker DC new--[employer];  Surgeon: Matty Garcia MD;  Location: [facility] INVASIVE LOCATION;  Service: Cardiovascular;  Laterality: N/A;         FAMILY HISTORY  No family history on file.      SOCIAL HISTORY  Social History     Socioeconomic History    Marital status:    Tobacco Use    Smoking status: Never     Passive exposure: Never    Smokeless tobacco: Never   Vaping Use    Vaping status: Never Used   Substance and Sexual Activity    Alcohol use: Never    Drug use: Never    Sexual activity: Defer       ALLERGIES  Patient has no known allergies.      PHYSICAL EXAM    I have reviewed the triage vital signs and nursing notes.    ED Triage Vitals   Temp Heart Rate Resp BP SpO2   03/02/25 1448 03/02/25 1448 03/02/25 1448 03/02/25 1450 03/02/25 1448   97.4 °F (36.3 °C) 95 16 141/84 97 %      Temp src Heart Rate Source Patient Position BP Location FiO2 (%)   -- -- -- -- --              Physical  Exam  GENERAL: alert, no acute distress  SKIN: Warm, dry  HENT: Normocephalic, atraumatic  EYES: no scleral icterus  CV: regular rhythm, regular rate  RESPIRATORY: normal effort, lungs clear  ABDOMEN: soft, nondistended, nontender  MUSCULOSKELETAL: no deformity, trace pitting edema bilateral ankles  NEURO: alert, moves all extremities, follows commands        LAB RESULTS  Recent Results (from the past 24 hours)   POC Glucose Once    Collection Time: 07/18/25  9:14 PM    Specimen: Blood   Result Value Ref Range    Glucose 295 (H) 70 - 130 mg/dL   ECG 12 Lead Other; dizziness    Collection Time: 07/18/25  9:26 PM   Result Value Ref Range    QT Interval 439 ms    QTC Interval 479 ms   Comprehensive Metabolic Panel    Collection Time: 07/18/25  9:36 PM    Specimen: Blood   Result Value Ref Range    Glucose 316 (H) 65 - 99 mg/dL    BUN 25.0 (H) 8.0 - 23.0 mg/dL    Creatinine 1.05 (H) 0.57 - 1.00 mg/dL    Sodium 134 (L) 136 - 145 mmol/L    Potassium 4.0 3.5 - 5.2 mmol/L    Chloride 95 (L) 98 - 107 mmol/L    CO2 28.0 22.0 - 29.0 mmol/L    Calcium 9.3 8.6 - 10.5 mg/dL    Total Protein 7.6 6.0 - 8.5 g/dL    Albumin 4.3 3.5 - 5.2 g/dL    ALT (SGPT) 22 1 - 33 U/L    AST (SGOT) 17 1 - 32 U/L    Alkaline Phosphatase 131 (H) 39 - 117 U/L    Total Bilirubin 0.3 0.0 - 1.2 mg/dL    Globulin 3.3 gm/dL    A/G Ratio 1.3 g/dL    BUN/Creatinine Ratio 23.8 7.0 - 25.0    Anion Gap 11.0 5.0 - 15.0 mmol/L    eGFR 54.8 (L) >60.0 mL/min/1.73   High Sensitivity Troponin T    Collection Time: 07/18/25  9:36 PM    Specimen: Blood   Result Value Ref Range    HS Troponin T 13 <14 ng/L   Magnesium    Collection Time: 07/18/25  9:36 PM    Specimen: Blood   Result Value Ref Range    Magnesium 2.1 1.6 - 2.4 mg/dL   Green Top (Gel)    Collection Time: 07/18/25  9:36 PM   Result Value Ref Range    Extra Tube Hold for add-ons.    Lavender Top    Collection Time: 07/18/25  9:36 PM   Result Value Ref Range    Extra Tube hold for add-on    Gold Top - SST     Collection Time: 07/18/25  9:36 PM   Result Value Ref Range    Extra Tube Hold for add-ons.    Light Blue Top    Collection Time: 07/18/25  9:36 PM   Result Value Ref Range    Extra Tube Hold for add-ons.    CBC Auto Differential    Collection Time: 07/18/25  9:36 PM    Specimen: Blood   Result Value Ref Range    WBC 7.94 3.40 - 10.80 10*3/mm3    RBC 4.94 3.77 - 5.28 10*6/mm3    Hemoglobin 14.2 12.0 - 15.9 g/dL    Hematocrit 44.8 34.0 - 46.6 %    MCV 90.7 79.0 - 97.0 fL    MCH 28.7 26.6 - 33.0 pg    MCHC 31.7 31.5 - 35.7 g/dL    RDW 14.4 12.3 - 15.4 %    RDW-SD 46.7 37.0 - 54.0 fl    MPV 11.3 6.0 - 12.0 fL    Platelets 182 140 - 450 10*3/mm3    Neutrophil % 60.1 42.7 - 76.0 %    Lymphocyte % 30.1 19.6 - 45.3 %    Monocyte % 6.7 5.0 - 12.0 %    Eosinophil % 2.3 0.3 - 6.2 %    Basophil % 0.4 0.0 - 1.5 %    Immature Grans % 0.4 0.0 - 0.5 %    Neutrophils, Absolute 4.78 1.70 - 7.00 10*3/mm3    Lymphocytes, Absolute 2.39 0.70 - 3.10 10*3/mm3    Monocytes, Absolute 0.53 0.10 - 0.90 10*3/mm3    Eosinophils, Absolute 0.18 0.00 - 0.40 10*3/mm3    Basophils, Absolute 0.03 0.00 - 0.20 10*3/mm3    Immature Grans, Absolute 0.03 0.00 - 0.05 10*3/mm3    nRBC 0.0 0.0 - 0.2 /100 WBC   POC Glucose Once    Collection Time: 07/18/25 10:47 PM    Specimen: Blood   Result Value Ref Range    Glucose 215 (H) 70 - 130 mg/dL   High Sensitivity Troponin T 1Hr    Collection Time: 07/18/25 10:52 PM    Specimen: Blood   Result Value Ref Range    HS Troponin T 12 <14 ng/L    Troponin T Numeric Delta -1 Abnormal if >/=3 ng/L   Urinalysis With Microscopic If Indicated (No Culture) - Urine, Clean Catch    Collection Time: 07/19/25 12:49 AM    Specimen: Urine, Clean Catch   Result Value Ref Range    Color, UA Yellow Yellow, Straw    Appearance, UA Clear Clear    pH, UA 5.5 5.0 - 8.0    Specific Gravity, UA 1.006 1.005 - 1.030    Glucose,  mg/dL (Trace) (A) Negative    Ketones, UA Negative Negative    Bilirubin, UA Negative Negative    Blood, UA  Negative Negative    Protein, UA Negative Negative    Leuk Esterase, UA Negative Negative    Nitrite, UA Negative Negative    Urobilinogen, UA 0.2 E.U./dL 0.2 - 1.0 E.U./dL   POC Glucose Once    Collection Time: 07/19/25  1:48 AM    Specimen: Blood   Result Value Ref Range    Glucose 248 (H) 70 - 130 mg/dL       RADIOLOGY  XR Chest 1 View  Result Date: 7/18/2025  SINGLE VIEW OF THE CHEST  HISTORY: Weakness and dizziness  COMPARISON: September 8, 2023  FINDINGS: There is cardiomegaly. There is no vascular congestion. Left-sided pacemaker is present. No pneumothorax or pleural effusion is seen. No acute infiltrates are identified. Lung volumes appear mildly diminished, similar to prior study.      Mildly diminished lung volumes.  This report was finalized on 7/18/2025 10:35 PM by Dr. Nimisha Vaughan M.D on Workstation: [id number]          MEDICATIONS GIVEN IN ER  Medications   insulin regular (humuLIN R,novoLIN R) injection 5 Units (5 Units Intravenous Given 7/18/25 2209)   hydrALAZINE (APRESOLINE) injection 20 mg (20 mg Intravenous Given 7/18/25 2207)   ondansetron (ZOFRAN) injection 4 mg (4 mg Intravenous Given 7/18/25 2256)   sodium chloride 0.9 % bolus 500 mL (0 mL Intravenous Stopped 7/19/25 0214)         ORDERS PLACED DURING THIS VISIT:  Orders Placed This Encounter   Procedures    XR Chest 1 View    Hoffman Draw    Comprehensive Metabolic Panel    High Sensitivity Troponin T    Magnesium    Urinalysis With Microscopic If Indicated (No Culture) - Urine, Clean Catch    CBC Auto Differential    High Sensitivity Troponin T 1Hr    POC Glucose Once    POC Glucose Once    POC Glucose Once    POC Glucose STAT    POC Glucose Once    ECG 12 Lead Other; dizziness    CBC & Differential    Green Top (Gel)    Lavender Top    Gold Top - SST    Light Blue Top         OUTPATIENT MEDICATION MANAGEMENT:  No current Epic-ordered facility-administered medications on file.     Current Outpatient Medications Ordered in Epic    Medication Sig Dispense Refill    albuterol sulfate  (90 Base) MCG/ACT inhaler Inhale 2 puffs Every 4 (Four) Hours As Needed for Wheezing. 18 g 0    cetirizine (zyrTEC) 10 MG tablet Take 1 tablet by mouth Daily.      dapagliflozin Propanediol (Farxiga) 10 MG tablet Take 10 mg by mouth Daily.      dextromethorphan-guaifenesin (ROBITUSSIN-DM)  MG/5ML liquid Take 5 mL by mouth Every 12 (Twelve) Hours.      Diclofenac Sodium (VOLTAREN) 1 % gel gel Apply 4 g topically to the appropriate area as directed.      fluticasone (FLONASE) 50 MCG/ACT nasal spray Administer 2 sprays into the nostril(s) as directed by provider Daily.      furosemide (LASIX) 40 MG tablet Take 1 tablet by mouth 2 (Two) Times a Day.      glimepiride (AMARYL) 4 MG tablet Take 1 tablet by mouth Daily.      hydrALAZINE (APRESOLINE) 50 MG tablet Take 1 tablet by mouth 3 (Three) Times a Day. 270 tablet 1    levothyroxine (SYNTHROID, LEVOTHROID) 25 MCG tablet Take 1 tablet by mouth Daily.      losartan (COZAAR) 50 MG tablet Take 1 tablet by mouth Daily.      meclizine (ANTIVERT) 25 MG tablet Take 1 tablet by mouth 3 (Three) Times a Day As Needed.      montelukast (SINGULAIR) 10 MG tablet Take 1 tablet by mouth Daily.      nystatin (MYCOSTATIN) 564823 UNIT/GM powder Apply  topically to the appropriate area as directed 4 (Four) Times a Day.      nystatin-triamcinolone (MYCOLOG II) 735598-6.1 UNIT/GM-% cream Apply  topically to the appropriate area as directed 4 (Four) Times a Day.      pantoprazole (PROTONIX) 40 MG EC tablet Take 1 tablet by mouth Daily.      prazosin (MINIPRESS) 1 MG capsule Take 1 capsule by mouth Daily.      rosuvastatin (CRESTOR) 40 MG tablet Take 1 tablet by mouth Every Night.      sucralfate (CARAFATE) 1 g tablet Take 1 tablet by mouth 4 (Four) Times a Day.      tiZANidine (ZANAFLEX) 2 MG tablet Take 1 tablet by mouth.      topiramate (TOPAMAX) 50 MG tablet Take 1 tablet by mouth 2 (Two) Times a Day.      traZODone  (DESYREL) 50 MG tablet Take 1 tablet by mouth Daily.      Trelegy Ellipta 200-62.5-25 MCG/ACT inhaler Inhale 1 puff Daily.           PROCEDURES  Procedures            PROGRESS, DATA ANALYSIS, CONSULTS, AND MEDICAL DECISION MAKING  All labs have been independently interpreted by me.  All radiology studies have been reviewed by me. All EKG's have been independently viewed and interpreted by me.  Discussion below represents my analysis of pertinent findings related to patient's condition, differential diagnosis, treatment plan and final disposition.    Differential diagnosis includes but is not limited to vertigo, stroke, adult hypertension, hypertensive emergency, hyper glycemia, uncontrolled diabetes, HHS, DKA, UTI.    Clinical Scores:                                       ED Course as of 07/19/25 1508   Fri Jul 18, 2025 2121  used for H&P.  Patient presents to the ED for evaluation of dizziness, elevated blood sugar, and elevated blood pressure onset 3 hours prior to arrival.  Patient states the dizziness is mild, like the room is swinging.  She denies any chest pain, difficulty breathing, abdominal pain, or urinary symptoms.  She states this is not happened in the past with elevated blood pressure or elevated glucose.  Patient and family are worried about her elevated blood pressure due to having a pacemaker.  No other complaints at this time.    On exam patient is well-appearing, trace pitting edema bilateral ankles    Will obtain basic labs, cardiac enzymes, UA.  No stroke-like symptoms at this time.  Blood pressure elevated to 180s, will give light blood pressure control.  Patient family are agreeable [DN]   2128 Glucose(!): 295  Will give 5 units IV insulin, patient's not insulin-dependent [DN]   2128 ECG 12 Lead Other; dizziness  Atrially sensed ventricular paced rhythm, rate 71, leftward axis, first-degree AV block, no significant T ST changes, no STEMI    I compared EKG to prior on  9/8/2023.  No significant change.  EKGs interpreted by self [DN]   2213 HS Troponin T: 13 [DN]   2214 Magnesium: 2.1 [DN]   2214 Creatinine(!): 1.05  Similar to prior of 0.92 on labs 1 year ago [DN]   2252 Patient reports feeling nauseous and lightheaded, blood sugar checked, 215.  Blood pressure was 80 systolic, now 101 systolic.   500 cc IV fluid bolus [DN]   2302 /51, feels significantly improved. I discussed results w/ pt and family at bedside. Reassuring work up thus far, other than hyperglycemia which is improving    Dispo pending 2nd trop and reassessment. If patient is improved, plan for d/c home. For persistent sx plan to admit for obs [DN]   2312 Joined care team [MP]   Sat Jul 19, 2025   0628 I reassessed the patient after repeat troponin which was flat and not uptrending.  She reports complete symptomatic relief.  Blood pressure has normalized.  She is still mildly hyperglycemic although does not have evidence of DKA.  Patient agreeable with discharge home and has scheduled follow-up with her PCP on Monday.  Discussed ED return precautions.  She is otherwise well-appearing, hemodynamically stable, and therefore appropriate for discharge. [MP]      ED Course User Index  [DN] Canelo Joyner MD  [MP] Latrice Watts PA-C             AS OF 15:08 EDT VITALS:    BP - 132/56  HR - 89  TEMP - 98.8 °F (37.1 °C) (Oral)  O2 SATS - 94%    COMPLEXITY OF CARE  Admission was considered but after careful review of the patient's presentation, physical examination, diagnostic results, and response to treatment the patient may be safely discharged with outpatient follow-up.      DIAGNOSIS  Final diagnoses:   Dizziness   Hyperglycemia         DISPOSITION  ED Disposition       ED Disposition   Discharge    Condition   Stable    Comment   --               New Medications Ordered This Visit   Medications    insulin regular (humuLIN R,novoLIN R) injection 5 Units    hydrALAZINE (APRESOLINE) injection 20 mg     ondansetron (ZOFRAN) injection 4 mg    sodium chloride 0.9 % bolus 500 mL       Please note that portions of this document were completed with a voice recognition program.    Note Disclaimer: At [facility], we believe that sharing information builds trust and better relationships. You are receiving this note because you recently visited [facility]. It is possible you will see health information before a provider has talked with you about it. This kind of information can be easy to misunderstand. To help you fully understand what it means for your health, we urge you to discuss this note with your provider.         Canelo Joyner MD  07/18/25 0431       Canelo Joyner MD  07/19/25 8723

## 2025-07-19 NOTE — ED PROVIDER NOTES
ED Course as of 07/19/25 0628 Fri Jul 18, 2025 2121  used for H&P.  Patient presents to the ED for evaluation of dizziness, elevated blood sugar, and elevated blood pressure onset 3 hours prior to arrival.  Patient states the dizziness is mild, like the room is swinging.  She denies any chest pain, difficulty breathing, abdominal pain, or urinary symptoms.  She states this is not happened in the past with elevated blood pressure or elevated glucose.  Patient and family are worried about her elevated blood pressure due to having a pacemaker.  No other complaints at this time.    On exam patient is well-appearing, trace pitting edema bilateral ankles    Will obtain basic labs, cardiac enzymes, UA.  No stroke-like symptoms at this time.  Blood pressure elevated to 180s, will give light blood pressure control.  Patient family are agreeable [DN]   2128 Glucose(!): 295  Will give 5 units IV insulin, patient's not insulin-dependent [DN]   2128 ECG 12 Lead Other; dizziness  Atrially sensed ventricular paced rhythm, rate 71, leftward axis, first-degree AV block, no significant T ST changes, no STEMI    I compared EKG to prior on 9/8/2023.  No significant change.  EKGs interpreted by self [DN]   2213 HS Troponin T: 13 [DN]   2214 Magnesium: 2.1 [DN]   2214 Creatinine(!): 1.05  Similar to prior of 0.92 on labs 1 year ago [DN]   2252 Patient reports feeling nauseous and lightheaded, blood sugar checked, 215.  Blood pressure was 80 systolic, now 101 systolic.   500 cc IV fluid bolus [DN]   2302 /51, feels significantly improved. I discussed results w/ pt and family at bedside. Reassuring work up thus far, other than hyperglycemia which is improving    Dispo pending 2nd trop and reassessment. If patient is improved, plan for d/c home. For persistent sx plan to admit for obs [DN]   2312 Joined care team [MP]   Sat Jul 19, 2025 0628 I reassessed the patient after repeat troponin which was flat and not  uptrending.  She reports complete symptomatic relief.  Blood pressure has normalized.  She is still mildly hyperglycemic although does not have evidence of DKA.  Patient agreeable with discharge home and has scheduled follow-up with her PCP on Monday.  Discussed ED return precautions.  She is otherwise well-appearing, hemodynamically stable, and therefore appropriate for discharge. [MP]      ED Course User Index  [DN] Canelo Joyner MD  [MP] Latrice Watts PA-C Pleiss, Melanie M, PA-C  07/19/25 0628     Initial (On Arrival)

## 2025-07-19 NOTE — DISCHARGE INSTRUCTIONS
Follow-up with primary care provider on Monday.  Take all of your prescribed medications and drink water to stay hydrated.  Return to emergency department for any worsening symptoms.

## 2025-07-29 ENCOUNTER — OFFICE VISIT (OUTPATIENT)
Age: 75
End: 2025-07-29
Payer: MEDICARE

## 2025-07-29 VITALS
HEART RATE: 80 BPM | OXYGEN SATURATION: 94 % | HEIGHT: 61 IN | DIASTOLIC BLOOD PRESSURE: 72 MMHG | BODY MASS INDEX: 35.3 KG/M2 | SYSTOLIC BLOOD PRESSURE: 128 MMHG | WEIGHT: 187 LBS

## 2025-07-29 DIAGNOSIS — R00.2 PALPITATIONS: ICD-10-CM

## 2025-07-29 DIAGNOSIS — I10 PRIMARY HYPERTENSION: ICD-10-CM

## 2025-07-29 DIAGNOSIS — R06.09 DOE (DYSPNEA ON EXERTION): Primary | ICD-10-CM

## 2025-07-29 PROCEDURE — 3074F SYST BP LT 130 MM HG: CPT | Performed by: STUDENT IN AN ORGANIZED HEALTH CARE EDUCATION/TRAINING PROGRAM

## 2025-07-29 PROCEDURE — 3078F DIAST BP <80 MM HG: CPT | Performed by: STUDENT IN AN ORGANIZED HEALTH CARE EDUCATION/TRAINING PROGRAM

## 2025-07-29 PROCEDURE — 1159F MED LIST DOCD IN RCRD: CPT | Performed by: STUDENT IN AN ORGANIZED HEALTH CARE EDUCATION/TRAINING PROGRAM

## 2025-07-29 PROCEDURE — 1160F RVW MEDS BY RX/DR IN RCRD: CPT | Performed by: STUDENT IN AN ORGANIZED HEALTH CARE EDUCATION/TRAINING PROGRAM

## 2025-07-29 PROCEDURE — 99214 OFFICE O/P EST MOD 30 MIN: CPT | Performed by: STUDENT IN AN ORGANIZED HEALTH CARE EDUCATION/TRAINING PROGRAM

## 2025-07-29 NOTE — PROGRESS NOTES
Subjective:     Encounter Date:07/29/2025      Patient ID: Camryn Dorsey is a 75 y.o. female.    Chief Complaint:  Follow-up post pacemaker    HPI:   75 y.o. female with hypertension, diabetes and high degree AV block s/p pacemaker 9/8/2023 who presents for follow up.  Patient notes that she has been experiencing palpitations for months now.  The occur about once a week and last 30 minutes.  Her face feels flushed and at times she feels a heat sensation of her back.  She also complains of dyspnea on exertion and lower extremity edema that is worse at the end of the day.      The following portions of the patient's history were reviewed and updated as appropriate: allergies, current medications, past family history, past medical history, past social history, past surgical history and problem list.     REVIEW OF SYSTEMS:   All systems reviewed.  Pertinent positives identified in HPI.  All other systems are negative.    Past Medical History:   Diagnosis Date    Chronic back pain 09/08/2023    Chronic headache 09/08/2023    COPD (chronic obstructive pulmonary disease) 09/08/2023    Diabetes mellitus     DM (diabetes mellitus) 09/07/2023    Suspected sleep apnea 09/08/2023    Venous insufficiency of both lower extremities 09/08/2023       History reviewed. No pertinent family history.    Social History     Socioeconomic History    Marital status:    Tobacco Use    Smoking status: Never     Passive exposure: Never    Smokeless tobacco: Never   Vaping Use    Vaping status: Never Used   Substance and Sexual Activity    Alcohol use: Never    Drug use: Never    Sexual activity: Defer       No Known Allergies    Past Surgical History:   Procedure Laterality Date    CARDIAC ELECTROPHYSIOLOGY PROCEDURE N/A 9/8/2023    Procedure: Pacemaker DC new--[employer];  Surgeon: Matty Garcia MD;  Location: [facility] INVASIVE LOCATION;  Service: Cardiovascular;  Laterality: N/A;       Procedures        Objective:         Vitals:    07/29/25 1009   BP: 128/72   Pulse: 80   SpO2: 94%       PHYSICAL EXAM:  GEN: well appearing, in NAD   HEENT: NCAT, EOMI, moist mucus membranes   Respiratory: CTAB, no wheezes, rales or rhonchi  CV: normal rate, regular rhythm, normal S1, S2, no murmurs, rubs, gallops, +2 radial pulses b/l  GI: soft, nontender, nondistended  MSK: no edema  Skin: no rash, warm, dry  Heme/Lymph: no bruising or bleeding  Neuro: Alert and Oriented x 3, grossly normal motor function        Assessment:         (R06.09) HARDY (dyspnea on exertion) - Plan: Adult Transthoracic Echo Complete w/ Color, Spectral and Contrast if Necessary Per Protocol    (R00.2) Palpitations    (I10) Primary hypertension    75 y.o. female with hypertension, diabetes and high degree AV block s/p pacemaker 9/8/2023 who presents for follow up.         Plan:       #Palpitations  Unclear etiology.  Recent pacemaker check a month ago revealed normal functioning pacemaker without any arrhythmias.    #Dyspnea on exertion  Unclear etiology, given that she paces almost 100% of the time, will evaluate with echocardiogram to rule out pacemaker induced cardiomyopathy.    #HTN  Currently well-controlled.  -Continue hydralazine 50 mg, losartan 50 mg      I will see the patient again in the office in 3 months or earlier as needed.         Petr Jauregui MD  07/29/25  [facility]    Outpatient Encounter Medications as of 7/29/2025   Medication Sig Dispense Refill    albuterol sulfate  (90 Base) MCG/ACT inhaler Inhale 2 puffs Every 4 (Four) Hours As Needed for Wheezing. 18 g 0    cetirizine (zyrTEC) 10 MG tablet Take 1 tablet by mouth Daily.      dapagliflozin Propanediol (Farxiga) 10 MG tablet Take 10 mg by mouth Daily.      dextromethorphan-guaifenesin (ROBITUSSIN-DM)  MG/5ML liquid Take 5 mL by mouth Every 12 (Twelve) Hours.      Diclofenac Sodium (VOLTAREN) 1 % gel gel Apply 4 g topically to the appropriate area as  directed.      fluticasone (FLONASE) 50 MCG/ACT nasal spray Administer 2 sprays into the nostril(s) as directed by provider Daily.      furosemide (LASIX) 40 MG tablet Take 1 tablet by mouth 2 (Two) Times a Day.      glimepiride (AMARYL) 4 MG tablet Take 1 tablet by mouth Daily.      hydrALAZINE (APRESOLINE) 50 MG tablet Take 1 tablet by mouth 3 (Three) Times a Day. 270 tablet 1    levothyroxine (SYNTHROID, LEVOTHROID) 25 MCG tablet Take 1 tablet by mouth Daily.      losartan (COZAAR) 50 MG tablet Take 1 tablet by mouth Daily.      meclizine (ANTIVERT) 25 MG tablet Take 1 tablet by mouth 3 (Three) Times a Day As Needed.      montelukast (SINGULAIR) 10 MG tablet Take 1 tablet by mouth Daily.      nystatin (MYCOSTATIN) 127541 UNIT/GM powder Apply  topically to the appropriate area as directed 4 (Four) Times a Day.      nystatin-triamcinolone (MYCOLOG II) 469676-3.1 UNIT/GM-% cream Apply  topically to the appropriate area as directed 4 (Four) Times a Day.      pantoprazole (PROTONIX) 40 MG EC tablet Take 1 tablet by mouth Daily.      prazosin (MINIPRESS) 1 MG capsule Take 1 capsule by mouth Daily.      rosuvastatin (CRESTOR) 40 MG tablet Take 1 tablet by mouth Every Night.      sucralfate (CARAFATE) 1 g tablet Take 1 tablet by mouth 4 (Four) Times a Day.      tiZANidine (ZANAFLEX) 2 MG tablet Take 1 tablet by mouth.      topiramate (TOPAMAX) 50 MG tablet Take 1 tablet by mouth 2 (Two) Times a Day.      traZODone (DESYREL) 50 MG tablet Take 1 tablet by mouth Daily.      Trelegy Ellipta 200-62.5-25 MCG/ACT inhaler Inhale 1 puff Daily.       No facility-administered encounter medications on file as of 7/29/2025.

## 2025-08-08 ENCOUNTER — HOSPITAL ENCOUNTER (OUTPATIENT)
Dept: CARDIOLOGY | Facility: HOSPITAL | Age: 75
Discharge: HOME | End: 2025-08-08
Payer: MEDICARE

## 2025-08-08 VITALS
OXYGEN SATURATION: 94 % | BODY MASS INDEX: 35.3 KG/M2 | DIASTOLIC BLOOD PRESSURE: 80 MMHG | SYSTOLIC BLOOD PRESSURE: 170 MMHG | HEIGHT: 61 IN | WEIGHT: 187 LBS | HEART RATE: 70 BPM

## 2025-08-08 DIAGNOSIS — R06.09 DOE (DYSPNEA ON EXERTION): ICD-10-CM

## 2025-08-08 LAB
AORTIC ARCH: 2.2 CM
AORTIC DIMENSIONLESS INDEX: 0.69 (DI)
ASCENDING AORTA: 3.4 CM
AV MEAN PRESS GRAD SYS DOP V1V2: 5 MMHG
AV VMAX SYS DOP: 152 CM/SEC
BH CV ECHO LEFT VENTRICLE GLOBAL LONGITUDINAL STRAIN: -22.1 %
BH CV ECHO MEAS - ACS: 1.79 CM
BH CV ECHO MEAS - AO MAX PG: 9.2 MMHG
BH CV ECHO MEAS - AO ROOT DIAM: 2.8 CM
BH CV ECHO MEAS - AO V2 VTI: 32.3 CM
BH CV ECHO MEAS - AVA(I,D): 1.76 CM2
BH CV ECHO MEAS - EDV(CUBED): 59.3 ML
BH CV ECHO MEAS - EDV(MOD-SP2): 86 ML
BH CV ECHO MEAS - EDV(MOD-SP4): 92 ML
BH CV ECHO MEAS - EF(MOD-SP2): 74.4 %
BH CV ECHO MEAS - EF(MOD-SP4): 66.3 %
BH CV ECHO MEAS - ESV(CUBED): 14.2 ML
BH CV ECHO MEAS - ESV(MOD-SP2): 22 ML
BH CV ECHO MEAS - ESV(MOD-SP4): 31 ML
BH CV ECHO MEAS - FS: 37.9 %
BH CV ECHO MEAS - IVS/LVPW: 1 CM
BH CV ECHO MEAS - IVSD: 1.1 CM
BH CV ECHO MEAS - LAT PEAK E' VEL: 5.7 CM/SEC
BH CV ECHO MEAS - LV DIASTOLIC VOL/BSA (35-75): 50.1 CM2
BH CV ECHO MEAS - LV MASS(C)D: 140.1 GRAMS
BH CV ECHO MEAS - LV MAX PG: 4.2 MMHG
BH CV ECHO MEAS - LV MEAN PG: 2 MMHG
BH CV ECHO MEAS - LV SYSTOLIC VOL/BSA (12-30): 16.9 CM2
BH CV ECHO MEAS - LV V1 MAX: 102 CM/SEC
BH CV ECHO MEAS - LV V1 VTI: 22.3 CM
BH CV ECHO MEAS - LVIDD: 3.9 CM
BH CV ECHO MEAS - LVIDS: 2.42 CM
BH CV ECHO MEAS - LVOT AREA: 2.5 CM2
BH CV ECHO MEAS - LVOT DIAM: 1.8 CM
BH CV ECHO MEAS - LVPWD: 1.1 CM
BH CV ECHO MEAS - MED PEAK E' VEL: 7.3 CM/SEC
BH CV ECHO MEAS - MV A DUR: 0.13 SEC
BH CV ECHO MEAS - MV A MAX VEL: 141.8 CM/SEC
BH CV ECHO MEAS - MV DEC SLOPE: 498.7 CM/SEC2
BH CV ECHO MEAS - MV DEC TIME: 0.27 SEC
BH CV ECHO MEAS - MV E MAX VEL: 90 CM/SEC
BH CV ECHO MEAS - MV E/A: 0.63
BH CV ECHO MEAS - MV MAX PG: 8.1 MMHG
BH CV ECHO MEAS - MV MEAN PG: 2.9 MMHG
BH CV ECHO MEAS - MV P1/2T: 55 MSEC
BH CV ECHO MEAS - MV V2 VTI: 34.6 CM
BH CV ECHO MEAS - MVA(P1/2T): 4 CM2
BH CV ECHO MEAS - MVA(VTI): 1.64 CM2
BH CV ECHO MEAS - PA ACC TIME: 0.13 SEC
BH CV ECHO MEAS - PA V2 MAX: 130.3 CM/SEC
BH CV ECHO MEAS - PULM A REVS DUR: 0.12 SEC
BH CV ECHO MEAS - PULM A REVS VEL: 31.3 CM/SEC
BH CV ECHO MEAS - PULM DIAS VEL: 26.6 CM/SEC
BH CV ECHO MEAS - PULM S/D: 1.23
BH CV ECHO MEAS - PULM SYS VEL: 32.6 CM/SEC
BH CV ECHO MEAS - QP/QS: 1.3
BH CV ECHO MEAS - RAP SYSTOLE: 8 MMHG
BH CV ECHO MEAS - RV MAX PG: 2.7 MMHG
BH CV ECHO MEAS - RV V1 MAX: 82.7 CM/SEC
BH CV ECHO MEAS - RV V1 VTI: 19.7 CM
BH CV ECHO MEAS - RVOT DIAM: 2.18 CM
BH CV ECHO MEAS - RVSP: 38 MMHG
BH CV ECHO MEAS - SUP REN AO DIAM: 2.3 CM
BH CV ECHO MEAS - SV(LVOT): 56.8 ML
BH CV ECHO MEAS - SV(MOD-SP2): 64 ML
BH CV ECHO MEAS - SV(MOD-SP4): 61 ML
BH CV ECHO MEAS - SV(RVOT): 73.5 ML
BH CV ECHO MEAS - SVI(LVOT): 30.9 ML/M2
BH CV ECHO MEAS - SVI(MOD-SP2): 34.9 ML/M2
BH CV ECHO MEAS - SVI(MOD-SP4): 33.2 ML/M2
BH CV ECHO MEAS - TAPSE (>1.6): 2.9 CM
BH CV ECHO MEAS - TR MAX PG: 29.8 MMHG
BH CV ECHO MEAS - TR MAX VEL: 272.8 CM/SEC
BH CV ECHO MEASUREMENTS AVERAGE E/E' RATIO: 13.85
BH CV XLRA - RV BASE: 3 CM
BH CV XLRA - RV LENGTH: 6.6 CM
BH CV XLRA - RV MID: 2.39 CM
BH CV XLRA - TDI S': 14.6 CM/SEC
LEFT ATRIUM VOLUME INDEX: 24.6 ML/M2
LV EF BIPLANE MOD: 75 %
SINUS: 2.9 CM
STJ: 2.47 CM

## 2025-08-08 PROCEDURE — 93306 TTE W/DOPPLER COMPLETE: CPT

## 2025-08-08 PROCEDURE — 25510000001 PERFLUTREN 6.52 MG/ML SUSPENSION 2 ML VIAL: Performed by: STUDENT IN AN ORGANIZED HEALTH CARE EDUCATION/TRAINING PROGRAM

## 2025-08-08 PROCEDURE — 93356 MYOCRD STRAIN IMG SPCKL TRCK: CPT

## 2025-08-08 RX ADMIN — PERFLUTREN 1.5 ML: 6.52 INJECTION, SUSPENSION INTRAVENOUS at 11:14

## (undated) DEVICE — INTRO SHEATH PRELUDE SNAP .038 7F 13CM W/SDPRT

## (undated) DEVICE — TBG PENCL TELESCP MEGADYNE SMOKE EVAC 10FT

## (undated) DEVICE — Device

## (undated) DEVICE — ADAPT SEAL SAFESHEATH STD FOR ATTAIN 9F

## (undated) DEVICE — CATH DEFLECT SELECTSITE 9F 43CM W/ART HNDL

## (undated) DEVICE — INTRO SHEATH PRELUDE SNAP .038 9F 13CM W/SDPRT BLK

## (undated) DEVICE — LOU PACE DEFIB: Brand: MEDLINE INDUSTRIES, INC.

## (undated) DEVICE — SLITTER CATH GUIDE ATTAIN ADJ